# Patient Record
Sex: FEMALE | Race: BLACK OR AFRICAN AMERICAN | Employment: UNEMPLOYED | URBAN - METROPOLITAN AREA
[De-identification: names, ages, dates, MRNs, and addresses within clinical notes are randomized per-mention and may not be internally consistent; named-entity substitution may affect disease eponyms.]

---

## 2017-10-09 ENCOUNTER — HOSPITAL ENCOUNTER (EMERGENCY)
Age: 22
Discharge: HOME | End: 2017-10-09
Attending: EMERGENCY MEDICINE
Payer: MEDICAID

## 2017-10-09 VITALS
SYSTOLIC BLOOD PRESSURE: 136 MMHG | RESPIRATION RATE: 20 BRPM | BODY MASS INDEX: 40.49 KG/M2 | OXYGEN SATURATION: 98 % | WEIGHT: 258 LBS | DIASTOLIC BLOOD PRESSURE: 87 MMHG | HEART RATE: 72 BPM | HEIGHT: 67 IN | TEMPERATURE: 98.5 F

## 2017-10-09 DIAGNOSIS — S61.012A LACERATION OF LEFT THUMB WITHOUT DAMAGE TO NAIL, FOREIGN BODY PRESENCE UNSPECIFIED, INITIAL ENCOUNTER: Primary | ICD-10-CM

## 2017-10-09 PROCEDURE — 99283 EMERGENCY DEPT VISIT LOW MDM: CPT | Performed by: EMERGENCY MEDICINE

## 2017-10-09 PROCEDURE — 90471 IMMUNIZATION ADMIN: CPT | Performed by: EMERGENCY MEDICINE

## 2017-10-09 PROCEDURE — 77030019895 HC PCKNG STRP IODO -A

## 2017-10-09 PROCEDURE — 74011250636 HC RX REV CODE- 250/636: Performed by: EMERGENCY MEDICINE

## 2017-10-09 PROCEDURE — 97602 WOUND(S) CARE NON-SELECTIVE: CPT | Performed by: EMERGENCY MEDICINE

## 2017-10-09 PROCEDURE — 75810000293 HC SIMP/SUPERF WND  RPR: Performed by: EMERGENCY MEDICINE

## 2017-10-09 PROCEDURE — 74011250637 HC RX REV CODE- 250/637: Performed by: EMERGENCY MEDICINE

## 2017-10-09 PROCEDURE — 90715 TDAP VACCINE 7 YRS/> IM: CPT | Performed by: EMERGENCY MEDICINE

## 2017-10-09 RX ORDER — IBUPROFEN 800 MG/1
800 TABLET ORAL
Status: COMPLETED | OUTPATIENT
Start: 2017-10-09 | End: 2017-10-09

## 2017-10-09 RX ORDER — LANOLIN ALCOHOL/MO/W.PET/CERES
CREAM (GRAM) TOPICAL
COMMUNITY
End: 2018-02-07 | Stop reason: CLARIF

## 2017-10-09 RX ADMIN — TETANUS TOXOID, REDUCED DIPHTHERIA TOXOID AND ACELLULAR PERTUSSIS VACCINE, ADSORBED 0.5 ML: 5; 2.5; 8; 8; 2.5 SUSPENSION INTRAMUSCULAR at 19:13

## 2017-10-09 RX ADMIN — IBUPROFEN 800 MG: 800 TABLET ORAL at 20:36

## 2017-10-10 NOTE — ED NOTES
Non-surg candidate per MD.  Hand wrapped with ABX charlotte carr and anahi by Alexx Riley. Awaiting DC instructions.

## 2017-10-10 NOTE — DISCHARGE INSTRUCTIONS
Cuts Left Open: Care Instructions  Your Care Instructions    A cut can happen anywhere on your body. Sometimes a cut can injure the tendons, blood vessels, or nerves. A cut may be left open instead of being closed with stitches, staples, or adhesive. A cut may be left open when it is likely to become infected, because closing it can make infection even more likely. You will probably have a bandage. The doctor may want the cut to stay open the whole time it heals. This happens with some cuts when too much time has gone by since the cut happened. Or the doctor may tell you to come back to have the cut closed in 4 to 5 days, when there is less chance of infection. If the cut stays open while healing, your scar may be larger than if the cut was closed. But you can get treatment later to make the scar smaller. The doctor has checked you carefully, but problems can develop later. If you notice any problems or new symptoms, get medical treatment right away. Follow-up care is a key part of your treatment and safety. Be sure to make and go to all appointments, and call your doctor if you are having problems. It's also a good idea to know your test results and keep a list of the medicines you take. How can you care for yourself at home? · Keep the cut dry for the first 24 to 48 hours. After this, you can shower if your doctor okays it. Pat the cut dry. · Don't soak the cut, such as in a bathtub. Your doctor will tell you when it's safe to get the cut wet. · If your doctor told you how to care for your cut, follow your doctor's instructions. If you did not get instructions, follow this general advice:  ¨ After the first 24 to 48 hours, wash the cut with clean water 2 times a day. Don't use hydrogen peroxide or alcohol, which can slow healing. ¨ You may cover the cut with a thin layer of petroleum jelly, such as Vaseline, and a nonstick bandage.   ¨ Apply more petroleum jelly and replace the bandage as needed. · Prop up the injured area on a pillow anytime you sit or lie down during the next 3 days. Try to keep it above the level of your heart. This will help reduce swelling. · Avoid any activity that could cause your cut to get worse. · Take pain medicines exactly as directed. ¨ If the doctor gave you a prescription medicine for pain, take it as prescribed. ¨ If you are not taking a prescription pain medicine, ask your doctor if you can take an over-the-counter medicine. When should you call for help? Call your doctor now or seek immediate medical care if:  · You have new pain, or your pain gets worse. · The cut starts to bleed, and blood soaks through the bandage. Oozing small amounts of blood is normal.  · The skin near the cut is cold or pale or changes color. · You have tingling, weakness, or numbness near the cut. · You have trouble moving the area near the cut. · You have symptoms of infection, such as:  ¨ Increased pain, swelling, warmth, or redness around the cut. ¨ Red streaks leading from the cut. ¨ Pus draining from the cut. ¨ A fever. Watch closely for changes in your health, and be sure to contact your doctor if:  · The cut is not closing (getting smaller). · You do not get better as expected. Where can you learn more? Go to http://edenilson-jocy.info/. Enter [id number] in the search box to learn more about \"Cuts Left Open: Care Instructions. \"  Current as of: March 20, 2017  Content Version: 11.3  © 8441-1157 [employer]. Care instructions adapted under license by [employer] (which disclaims liability or warranty for this information). If you have questions about a medical condition or this instruction, always ask your healthcare professional. [place] [address] any warranty or liability for your use of this information.

## 2017-10-10 NOTE — ED NOTES
I have reviewed discharge instructions with the patient. The patient verbalized understanding. Patient to follow up with PMD and RTED with any changes/concerns. Patient expresses understanding. Patient ambulatory from ED in NAD. No new Rx.

## 2017-10-10 NOTE — ED PROVIDER NOTES
Patient is a 20 y.o. female presenting with skin laceration. The history is provided by the patient. Laceration    The incident occurred less than 1 hour ago. The laceration is located on the left hand. The laceration is 2 cm in size. The injury mechanism is a clean knife. Foreign body present: no. The pain is at a severity of 10/10. The pain is moderate. The pain has been constant since onset. Associated symptoms include numbness and tingling. Pertinent negatives include no weakness, no loss of motion, no coolness and no discoloration. It is unknown when the patient last had a tetanus shot. History reviewed. No pertinent past medical history. Past Surgical History:   Procedure Laterality Date    HX GYN      c section    HX ORTHOPAEDIC      left ankle         History reviewed. No pertinent family history. Social History     Social History    Marital status: SINGLE     Spouse name: N/A    Number of children: N/A    Years of education: N/A     Occupational History    Not on file. Social History Main Topics    Smoking status: Never Smoker    Smokeless tobacco: Never Used    Alcohol use No    Drug use: No    Sexual activity: Not on file     Other Topics Concern    Not on file     Social History Narrative         ALLERGIES: Review of patient's allergies indicates no known allergies. Review of Systems   Constitutional: Negative for chills and fever. Respiratory: Negative for chest tightness, shortness of breath and stridor. Skin: Negative for color change, pallor and wound. Neurological: Positive for tingling and numbness. Negative for syncope and weakness. All other systems reviewed and are negative.       Vitals:    10/09/17 1726 10/09/17 2029   BP: 142/78 136/87   Pulse: 84 72   Resp: 16 20   Temp: 98.1 °F (36.7 °C) 98.5 °F (36.9 °C)   SpO2: 100% 98%   Weight: 117 kg (258 lb)    Height: 5' 7\" (1.702 m)             Physical Exam   Constitutional: She is oriented to person, place, and time. She appears well-developed and well-nourished. No distress. HENT:   Head: Normocephalic and atraumatic. Eyes: Conjunctivae are normal. Right eye exhibits no discharge. Left eye exhibits no discharge. Neck: Neck supple. Cardiovascular: Intact distal pulses. Capillary refill intact. Pulmonary/Chest: Effort normal. No stridor. No respiratory distress. Musculoskeletal:   Pt movement intact of the thumb she is able to fully flex and extend joint it is painful with motion. Neurological: She is alert and oriented to person, place, and time. No focal weakness speech normal   Skin: Skin is warm and dry. 1.5 cm laceration through the medial aspect of the base off the thumb. Their is a tissue flap present. Psychiatric: She has a normal mood and affect. Her behavior is normal.   Nursing note and vitals reviewed. MDM  Number of Diagnoses or Management Options  Laceration of left thumb without damage to nail, foreign body presence unspecified, initial encounter:   Diagnosis management comments: Pt neuro intact wound left to heal by secondary intent. Ketan Pelletier MD; 10/9/2017 @8:41 PM Voice dictation software was used during the making of this note. This software is not perfect and grammatical and other typographical errors may be present.   This note has not been proofread for errors.  ===================================================================     ED Course       Wound Repair  Date/Time: 10/9/2017 8:38 PM  Performed by: attendingPreparation: skin prepped with Betadine  Location details: right thumb  Wound length:2.5 cm or less    Anesthesia:  Local Anesthetic: lidocaine 1% without epinephrine  Foreign bodies: no foreign bodies  Debridement: extensive (flap of devitalized tissue removed with sutures wound left to heal by secondary intent)  Dressing: antibiotic ointment, non-adhesive packing strip and pressure dressing  Patient tolerance: Patient tolerated the procedure well with no immediate complications  My total time at bedside, performing this procedure was 1-15 minutes.

## 2018-02-07 ENCOUNTER — HOSPITAL ENCOUNTER (EMERGENCY)
Age: 23
Discharge: HOME | End: 2018-02-07
Attending: EMERGENCY MEDICINE
Payer: SELF-PAY

## 2018-02-07 VITALS
HEIGHT: 67 IN | WEIGHT: 258 LBS | TEMPERATURE: 98.7 F | RESPIRATION RATE: 20 BRPM | BODY MASS INDEX: 40.49 KG/M2 | HEART RATE: 110 BPM | SYSTOLIC BLOOD PRESSURE: 103 MMHG | DIASTOLIC BLOOD PRESSURE: 68 MMHG | OXYGEN SATURATION: 98 %

## 2018-02-07 DIAGNOSIS — R11.2 NON-INTRACTABLE VOMITING WITH NAUSEA, UNSPECIFIED VOMITING TYPE: Primary | ICD-10-CM

## 2018-02-07 LAB
ALBUMIN SERPL-MCNC: 4 G/DL (ref 3.5–5)
ALBUMIN/GLOB SERPL: 0.9 {RATIO} (ref 1.2–3.5)
ALP SERPL-CCNC: 69 U/L (ref 50–136)
ALT SERPL-CCNC: 15 U/L (ref 12–65)
ANION GAP SERPL CALC-SCNC: 8 MMOL/L (ref 7–16)
AST SERPL-CCNC: 15 U/L (ref 15–37)
BASOPHILS # BLD: 0 K/UL (ref 0–0.2)
BASOPHILS NFR BLD: 0 % (ref 0–2)
BILIRUB SERPL-MCNC: 1 MG/DL (ref 0.2–1.1)
BUN SERPL-MCNC: 12 MG/DL (ref 6–23)
CALCIUM SERPL-MCNC: 9.3 MG/DL (ref 8.3–10.4)
CHLORIDE SERPL-SCNC: 105 MMOL/L (ref 98–107)
CO2 SERPL-SCNC: 26 MMOL/L (ref 21–32)
CREAT SERPL-MCNC: 0.8 MG/DL (ref 0.6–1)
DIFFERENTIAL METHOD BLD: ABNORMAL
EOSINOPHIL # BLD: 0 K/UL (ref 0–0.8)
EOSINOPHIL NFR BLD: 0 % (ref 0.5–7.8)
ERYTHROCYTE [DISTWIDTH] IN BLOOD BY AUTOMATED COUNT: 14 % (ref 11.9–14.6)
FLUAV AG NPH QL IA: NEGATIVE
FLUBV AG NPH QL IA: NEGATIVE
GLOBULIN SER CALC-MCNC: 4.6 G/DL (ref 2.3–3.5)
GLUCOSE SERPL-MCNC: 107 MG/DL (ref 65–100)
HCT VFR BLD AUTO: 35.8 % (ref 35.8–46.3)
HGB BLD-MCNC: 11.8 G/DL (ref 11.7–15.4)
IMM GRANULOCYTES # BLD: 0 K/UL (ref 0–0.5)
IMM GRANULOCYTES NFR BLD AUTO: 0 % (ref 0–5)
LYMPHOCYTES # BLD: 0.7 K/UL (ref 0.5–4.6)
LYMPHOCYTES NFR BLD: 20 % (ref 13–44)
MCH RBC QN AUTO: 25.9 PG (ref 26.1–32.9)
MCHC RBC AUTO-ENTMCNC: 33 G/DL (ref 31.4–35)
MCV RBC AUTO: 78.7 FL (ref 79.6–97.8)
MONOCYTES # BLD: 0.3 K/UL (ref 0.1–1.3)
MONOCYTES NFR BLD: 8 % (ref 4–12)
NEUTS SEG # BLD: 2.6 K/UL (ref 1.7–8.2)
NEUTS SEG NFR BLD: 72 % (ref 43–78)
PLATELET # BLD AUTO: 290 K/UL (ref 150–450)
PMV BLD AUTO: 11 FL (ref 10.8–14.1)
POTASSIUM SERPL-SCNC: 3.4 MMOL/L (ref 3.5–5.1)
PROT SERPL-MCNC: 8.6 G/DL (ref 6.3–8.2)
RBC # BLD AUTO: 4.55 M/UL (ref 4.05–5.25)
SODIUM SERPL-SCNC: 139 MMOL/L (ref 136–145)
WBC # BLD AUTO: 3.7 K/UL (ref 4.3–11.1)

## 2018-02-07 PROCEDURE — 99282 EMERGENCY DEPT VISIT SF MDM: CPT | Performed by: EMERGENCY MEDICINE

## 2018-02-07 PROCEDURE — 74011250636 HC RX REV CODE- 250/636: Performed by: EMERGENCY MEDICINE

## 2018-02-07 PROCEDURE — 80053 COMPREHEN METABOLIC PANEL: CPT | Performed by: EMERGENCY MEDICINE

## 2018-02-07 PROCEDURE — 87804 INFLUENZA ASSAY W/OPTIC: CPT | Performed by: EMERGENCY MEDICINE

## 2018-02-07 PROCEDURE — 96374 THER/PROPH/DIAG INJ IV PUSH: CPT | Performed by: EMERGENCY MEDICINE

## 2018-02-07 PROCEDURE — 85025 COMPLETE CBC W/AUTO DIFF WBC: CPT | Performed by: EMERGENCY MEDICINE

## 2018-02-07 PROCEDURE — 74011250637 HC RX REV CODE- 250/637: Performed by: EMERGENCY MEDICINE

## 2018-02-07 PROCEDURE — 96361 HYDRATE IV INFUSION ADD-ON: CPT | Performed by: EMERGENCY MEDICINE

## 2018-02-07 RX ORDER — DICLOFENAC SODIUM 50 MG/1
50 TABLET, DELAYED RELEASE ORAL 2 TIMES DAILY
Qty: 14 TAB | Refills: 0 | Status: SHIPPED | OUTPATIENT
Start: 2018-02-07

## 2018-02-07 RX ORDER — ONDANSETRON 2 MG/ML
4 INJECTION INTRAMUSCULAR; INTRAVENOUS
Status: COMPLETED | OUTPATIENT
Start: 2018-02-07 | End: 2018-02-07

## 2018-02-07 RX ORDER — HYOSCYAMINE SULFATE 0.12 MG/1
0.25 TABLET SUBLINGUAL
Status: COMPLETED | OUTPATIENT
Start: 2018-02-07 | End: 2018-02-07

## 2018-02-07 RX ORDER — PROMETHAZINE HYDROCHLORIDE 25 MG/1
25 TABLET ORAL
Qty: 12 TAB | Refills: 0 | Status: SHIPPED | OUTPATIENT
Start: 2018-02-07

## 2018-02-07 RX ADMIN — ONDANSETRON 4 MG: 2 INJECTION INTRAMUSCULAR; INTRAVENOUS at 12:18

## 2018-02-07 RX ADMIN — HYOSCYAMINE SULFATE 0.25 MG: 0.12 TABLET ORAL at 12:18

## 2018-02-07 RX ADMIN — SODIUM CHLORIDE 1000 ML: 900 INJECTION, SOLUTION INTRAVENOUS at 12:19

## 2018-02-07 NOTE — ED NOTES
I have reviewed discharge instructions with the patient. The patient verbalized understanding. Patient left ED via Discharge Method: ambulatory to Home with self. Opportunity for questions and clarification provided. Patient given 2 scripts. To continue your aftercare when you leave the hospital, you may receive an automated call from our care team to check in on how you are doing. This is a free service and part of our promise to provide the best care and service to meet your aftercare needs.  If you have questions, or wish to unsubscribe from this service please call [phone]. Thank you for Choosing our [facility] Emergency Department.

## 2018-02-07 NOTE — ED TRIAGE NOTES
C/o generalized body aches, abdominal pain, n/v/d and chills. Onset yesterday.   States exposed to flu

## 2018-02-07 NOTE — ED PROVIDER NOTES
HPI Comments: 62-year-old female who presents with concerns about vomiting and some mild abdominal pain. Patient says her pain started after she had been throwing up. She says her emesis has been nonbloody nonbilious and she's had no blood in her bowels. She denies any specific fevers and says she has never had a problem like this before. She has had exposure to the flu so she was worried that potentially she may be getting that. Elements of this note were made using speech recognition software. As such, there may be errors of speech recognition present. Patient is a 20 y.o. female presenting with general illness. The history is provided by the patient. Generalized Body Aches   Associated symptoms include abdominal pain. Pertinent negatives include no chest pain, no headaches and no shortness of breath. History reviewed. No pertinent past medical history. Past Surgical History:   Procedure Laterality Date    HX GYN      c section    HX ORTHOPAEDIC      left ankle         History reviewed. No pertinent family history. Social History     Social History    Marital status: SINGLE     Spouse name: N/A    Number of children: N/A    Years of education: N/A     Occupational History    Not on file. Social History Main Topics    Smoking status: Never Smoker    Smokeless tobacco: Never Used    Alcohol use No    Drug use: No    Sexual activity: Not on file     Other Topics Concern    Not on file     Social History Narrative         ALLERGIES: Review of patient's allergies indicates no known allergies. Review of Systems   Constitutional: Positive for chills. Negative for diaphoresis and fever. HENT: Negative for congestion, rhinorrhea and sore throat. Eyes: Negative for redness and visual disturbance. Respiratory: Negative for cough, chest tightness, shortness of breath and wheezing. Cardiovascular: Negative for chest pain and palpitations.    Gastrointestinal: Positive for abdominal pain, nausea and vomiting. Negative for blood in stool and diarrhea. Endocrine: Negative for polydipsia and polyuria. Genitourinary: Negative for dysuria and hematuria. Musculoskeletal: Negative for arthralgias, myalgias and neck stiffness. Skin: Negative for rash. Allergic/Immunologic: Negative for environmental allergies and food allergies. Neurological: Negative for dizziness, weakness and headaches. Hematological: Negative for adenopathy. Does not bruise/bleed easily. Psychiatric/Behavioral: Negative for confusion and sleep disturbance. The patient is not nervous/anxious. Vitals:    02/07/18 0956   BP: 103/68   Pulse: (!) 110   Resp: 20   Temp: 98.7 °F (37.1 °C)   SpO2: 98%   Weight: 117 kg (258 lb)   Height: 5' 7\" (1.702 m)            Physical Exam   Constitutional: She is oriented to person, place, and time. She appears well-developed and well-nourished. HENT:   Head: Normocephalic and atraumatic. Eyes: Conjunctivae and EOM are normal. Pupils are equal, round, and reactive to light. Neck: Normal range of motion. Cardiovascular: Regular rhythm. Patient's heart rate was 88 on my exam   Pulmonary/Chest: Effort normal and breath sounds normal. No respiratory distress. She has no wheezes. She has no rales. She exhibits no tenderness. Abdominal: Soft. Bowel sounds are normal. There is no rebound and no guarding. Musculoskeletal: Normal range of motion. She exhibits no edema or tenderness. Lymphadenopathy:     She has no cervical adenopathy. Neurological: She is alert and oriented to person, place, and time. Skin: Skin is warm and dry. Psychiatric: She has a normal mood and affect. Nursing note and vitals reviewed.        MDM  Number of Diagnoses or Management Options        ED Course       Procedures

## 2018-02-07 NOTE — DISCHARGE INSTRUCTIONS
Return with any fevers, blood in your vomiting, blood in your bowels, worsening symptoms,  Or additional concerns. Follow-up with your primary care doctor as needed.

## 2019-01-27 ENCOUNTER — APPOINTMENT (OUTPATIENT)
Dept: ULTRASOUND IMAGING | Age: 24
End: 2019-01-27
Attending: EMERGENCY MEDICINE
Payer: SELF-PAY

## 2019-01-27 ENCOUNTER — HOSPITAL ENCOUNTER (EMERGENCY)
Age: 24
Discharge: HOME | End: 2019-01-27
Attending: EMERGENCY MEDICINE | Admitting: EMERGENCY MEDICINE
Payer: SELF-PAY

## 2019-01-27 VITALS
HEART RATE: 105 BPM | BODY MASS INDEX: 40.02 KG/M2 | TEMPERATURE: 99.6 F | HEIGHT: 67 IN | RESPIRATION RATE: 16 BRPM | WEIGHT: 255 LBS | SYSTOLIC BLOOD PRESSURE: 110 MMHG | OXYGEN SATURATION: 100 % | DIASTOLIC BLOOD PRESSURE: 70 MMHG

## 2019-01-27 DIAGNOSIS — N73.0 PID (ACUTE PELVIC INFLAMMATORY DISEASE): Primary | ICD-10-CM

## 2019-01-27 DIAGNOSIS — A59.01 TRICHOMONAS VAGINITIS: ICD-10-CM

## 2019-01-27 LAB
ALBUMIN SERPL-MCNC: 3.4 G/DL (ref 3.5–5)
ALBUMIN/GLOB SERPL: 0.7 {RATIO} (ref 1.2–3.5)
ALP SERPL-CCNC: 204 U/L (ref 50–136)
ALT SERPL-CCNC: 407 U/L (ref 12–65)
ANION GAP SERPL CALC-SCNC: 9 MMOL/L (ref 7–16)
AST SERPL-CCNC: 434 U/L (ref 15–37)
BACTERIA URNS QL MICRO: 0 /HPF
BASOPHILS # BLD: 0.1 K/UL (ref 0–0.2)
BASOPHILS NFR BLD: 1 % (ref 0–2)
BILIRUB SERPL-MCNC: 0.8 MG/DL (ref 0.2–1.1)
BUN SERPL-MCNC: 7 MG/DL (ref 6–23)
CALCIUM SERPL-MCNC: 8.6 MG/DL (ref 8.3–10.4)
CASTS URNS QL MICRO: NORMAL /LPF
CHLORIDE SERPL-SCNC: 102 MMOL/L (ref 98–107)
CO2 SERPL-SCNC: 24 MMOL/L (ref 21–32)
CREAT SERPL-MCNC: 0.79 MG/DL (ref 0.6–1)
DIFFERENTIAL METHOD BLD: ABNORMAL
EOSINOPHIL # BLD: 0 K/UL (ref 0–0.8)
EOSINOPHIL NFR BLD: 0 % (ref 0.5–7.8)
EPI CELLS #/AREA URNS HPF: NORMAL /HPF
ERYTHROCYTE [DISTWIDTH] IN BLOOD BY AUTOMATED COUNT: 16.4 % (ref 11.9–14.6)
GLOBULIN SER CALC-MCNC: 4.6 G/DL (ref 2.3–3.5)
GLUCOSE SERPL-MCNC: 94 MG/DL (ref 65–100)
HCG UR QL: NEGATIVE
HCT VFR BLD AUTO: 30.7 % (ref 35.8–46.3)
HGB BLD-MCNC: 9 G/DL (ref 11.7–15.4)
IMM GRANULOCYTES # BLD AUTO: 0 K/UL (ref 0–0.5)
IMM GRANULOCYTES NFR BLD AUTO: 1 % (ref 0–5)
LACTATE BLD-SCNC: 1.14 MMOL/L (ref 0.5–1.9)
LYMPHOCYTES # BLD: 4.5 K/UL (ref 0.5–4.6)
LYMPHOCYTES NFR BLD: 67 % (ref 13–44)
MCH RBC QN AUTO: 21.3 PG (ref 26.1–32.9)
MCHC RBC AUTO-ENTMCNC: 29.3 G/DL (ref 31.4–35)
MCV RBC AUTO: 72.7 FL (ref 79.6–97.8)
MONOCYTES # BLD: 0.4 K/UL (ref 0.1–1.3)
MONOCYTES NFR BLD: 6 % (ref 4–12)
NEUTS SEG # BLD: 1.7 K/UL (ref 1.7–8.2)
NEUTS SEG NFR BLD: 25 % (ref 43–78)
NRBC # BLD: 0 K/UL (ref 0–0.2)
PLATELET # BLD AUTO: 230 K/UL (ref 150–450)
PMV BLD AUTO: 12.1 FL (ref 9.4–12.3)
POTASSIUM SERPL-SCNC: 3.6 MMOL/L (ref 3.5–5.1)
PROT SERPL-MCNC: 8 G/DL (ref 6.3–8.2)
RBC # BLD AUTO: 4.22 M/UL (ref 4.05–5.2)
RBC #/AREA URNS HPF: NORMAL /HPF
SERVICE CMNT-IMP: NORMAL
SODIUM SERPL-SCNC: 135 MMOL/L (ref 136–145)
WBC # BLD AUTO: 6.7 K/UL (ref 4.3–11.1)
WBC URNS QL MICRO: NORMAL /HPF
WET PREP GENITAL: NORMAL

## 2019-01-27 PROCEDURE — 83605 ASSAY OF LACTIC ACID: CPT

## 2019-01-27 PROCEDURE — 81025 URINE PREGNANCY TEST: CPT

## 2019-01-27 PROCEDURE — 87491 CHLMYD TRACH DNA AMP PROBE: CPT

## 2019-01-27 PROCEDURE — 76856 US EXAM PELVIC COMPLETE: CPT

## 2019-01-27 PROCEDURE — 96374 THER/PROPH/DIAG INJ IV PUSH: CPT | Performed by: EMERGENCY MEDICINE

## 2019-01-27 PROCEDURE — 81003 URINALYSIS AUTO W/O SCOPE: CPT | Performed by: EMERGENCY MEDICINE

## 2019-01-27 PROCEDURE — 99284 EMERGENCY DEPT VISIT MOD MDM: CPT | Performed by: EMERGENCY MEDICINE

## 2019-01-27 PROCEDURE — 74011250636 HC RX REV CODE- 250/636: Performed by: EMERGENCY MEDICINE

## 2019-01-27 PROCEDURE — 74011250637 HC RX REV CODE- 250/637: Performed by: EMERGENCY MEDICINE

## 2019-01-27 PROCEDURE — 80053 COMPREHEN METABOLIC PANEL: CPT

## 2019-01-27 PROCEDURE — 81015 MICROSCOPIC EXAM OF URINE: CPT

## 2019-01-27 PROCEDURE — 80074 ACUTE HEPATITIS PANEL: CPT

## 2019-01-27 PROCEDURE — 85025 COMPLETE CBC W/AUTO DIFF WBC: CPT

## 2019-01-27 PROCEDURE — 87210 SMEAR WET MOUNT SALINE/INK: CPT

## 2019-01-27 RX ORDER — ONDANSETRON 4 MG/1
4 TABLET, ORALLY DISINTEGRATING ORAL
Qty: 15 TAB | Refills: 0 | Status: SHIPPED | OUTPATIENT
Start: 2019-01-27 | End: 2019-04-01

## 2019-01-27 RX ORDER — DOXYCYCLINE HYCLATE 100 MG
100 TABLET ORAL 2 TIMES DAILY
Qty: 20 TAB | Refills: 0 | Status: SHIPPED | OUTPATIENT
Start: 2019-01-27 | End: 2019-02-06

## 2019-01-27 RX ORDER — IBUPROFEN 800 MG/1
800 TABLET ORAL
Qty: 20 TAB | Refills: 0 | Status: SHIPPED | OUTPATIENT
Start: 2019-01-27 | End: 2019-02-03

## 2019-01-27 RX ORDER — ACETAMINOPHEN 325 MG/1
650 TABLET ORAL
Status: COMPLETED | OUTPATIENT
Start: 2019-01-27 | End: 2019-01-27

## 2019-01-27 RX ORDER — HYDROCODONE BITARTRATE AND ACETAMINOPHEN 5; 325 MG/1; MG/1
1 TABLET ORAL
Qty: 10 TAB | Refills: 0 | Status: SHIPPED | OUTPATIENT
Start: 2019-01-27

## 2019-01-27 RX ORDER — ONDANSETRON 2 MG/ML
4 INJECTION INTRAMUSCULAR; INTRAVENOUS
Status: COMPLETED | OUTPATIENT
Start: 2019-01-27 | End: 2019-01-27

## 2019-01-27 RX ORDER — METRONIDAZOLE 500 MG/1
2000 TABLET ORAL
Status: COMPLETED | OUTPATIENT
Start: 2019-01-27 | End: 2019-01-27

## 2019-01-27 RX ADMIN — ONDANSETRON 4 MG: 2 INJECTION INTRAMUSCULAR; INTRAVENOUS at 21:33

## 2019-01-27 RX ADMIN — ACETAMINOPHEN 650 MG: 325 TABLET, FILM COATED ORAL at 20:09

## 2019-01-27 RX ADMIN — SODIUM CHLORIDE 1000 ML: 900 INJECTION, SOLUTION INTRAVENOUS at 20:10

## 2019-01-27 RX ADMIN — METRONIDAZOLE 2000 MG: 500 TABLET ORAL at 20:14

## 2019-01-27 NOTE — LETTER
[facility] EMERGENCY DEPT [address] [address] 
[phone] Work/School Note Date: 1/27/2019 To Whom It May concern: 
 
Mayuri Villarreal was seen and treated today in the emergency room by the following provider(s): 
Attending Provider: Asha Matamoros MD. Kayla Winchester {Return to school/sport/work:1/29/19 Sincerely, Ayni Nixon MD

## 2019-01-27 NOTE — ED TRIAGE NOTES
Pt states symptoms x \"a couple weeks\", pt states dizziness with standing. States nausea any time she eats something. Generalized body aches. Has been tested for the flu and tested negative. Pt states her urine today was dark orange. Pt states her menstrual cycle just finished and it was very heavy, way worse than anything she's had before. Pt states lots of mucus mixed with blood and clots. Pt states pelvic pain and low back pain. Pt states vaginal discharge is different than her normal, \"its orange\" she states. States her period ended on Friday and it was longer than normal as well.

## 2019-01-27 NOTE — LETTER
2/2/2019 April Ferrari [id number] [facility] [address] [place] [address] [phone] [facility] [address] Dear Ms. Hansenis Leesa, You were recently seen in the Emergency Department of [facility] and had blood work or x-rays performed. We would like to discuss these with you. Please call the Emergency Department at your earliest convenience. If you were seen at [facility], please dial [phone], and if you were seen at [facility], please call [phone] to speak with one of our providers. Sincerely, Reza Zhao MD 
Medical Director Emergency Services, [address] [place] [address]  
[phone]/ [phone]

## 2019-01-28 NOTE — ED NOTES
I have reviewed discharge instructions with the patient. The patient verbalized understanding. Patient left ED via Discharge Method: wheelchair to Home with sister. Opportunity for questions and clarification provided. Patient given 4 scripts. To continue your aftercare when you leave the hospital, you may receive an automated call from our care team to check in on how you are doing. This is a free service and part of our promise to provide the best care and service to meet your aftercare needs.  If you have questions, or wish to unsubscribe from this service please call [phone]. Thank you for Choosing our [facility] Emergency Department.

## 2019-01-28 NOTE — ED PROVIDER NOTES
HPI: 
69-year-old female here with multiple complaints. Has had cough and congestion and upper respiratory infection and headache for the past 2 weeks. No fever. Also having lower abdominal pain with yellow/green vaginal discharge for the past 2 weeks. Denies any urinary pain. No flank pain. Sexually active with one partner. Has history of irregular menstrual cycle. Most recent menstrual cycle was 2 days ago. ROS Constitutional: No fever, no chills Skin: no rash Eye: No vision changes + sinus pressure ENMT: No sore throat Respiratory: No shortness of breath, no cough. Cardiovascular: No chest pain, no palpitations Gastrointestinal: No vomiting, no nausea, no diarrhea, + abdominal pain : No dysuria MSK: No back pain, no muscle pain Neuro: No headache, no change in mental status, no numbness, no tingling, no weakness Psych:  
Endocrine:  
All other review of systems positive per history of present illness and the above otherwise negative or noncontributory. Visit Vitals /64 (BP 1 Location: Right arm, BP Patient Position: At rest) Pulse (!) 123 Temp 100.4 °F (38 °C) Resp 14 Ht 5' 7\" (1.702 m) Wt 115.7 kg (255 lb) SpO2 100% BMI 39.94 kg/m² History reviewed. No pertinent past medical history. Past Surgical History:  
Procedure Laterality Date  HX GYN    
 c section  HX ORTHOPAEDIC    
 left ankle Prior to Admission Medications Prescriptions Last Dose Informant Patient Reported? Taking?  
diclofenac EC (VOLTAREN) 50 mg EC tablet   No No  
Sig: Take 1 Tab by mouth two (2) times a day. promethazine (PHENERGAN) 25 mg tablet   No No  
Sig: Take 1 Tab by mouth every six (6) hours as needed for Nausea. Facility-Administered Medications: None Adult Exam  
General: alert, no acute distress Head: normocephalic, atraumatic ENT: moist mucous membranes TMs without otitis media. EOMI, PERRLA, boggy nasal turbinates. Posterior pharynx without any exudate or edema Neck: supple, non-tender; full range of motion. No meningeal sign Cardiovascular: regular rate and rhythm, normal peripheral perfusion, no edema Respiratory:  normal respirations; no wheezing, rales or rhonchi Gastrointestinal: soft, tender to palpation in the lower abdomen; no rebound or guarding, no peritoneal signs, no distension : Pelvic with yellow-green discharge. Cervical motion tenderness. No adnexal masses but generalized discomfort. Back: non-tender, full range of motion Musculoskeletal: normal range of motion, normal strength, no gross deformities Neurological: alert and oriented x 4, no gross focal deficits; normal speech Psychiatric: cooperative; appropriate mood and affect MDM: We will obtain ultrasound of pelvic for further assessment PID, tubo-ovarian abscess. Urine pregnancy test negative. Lactic acid of 1.1. Wet prep With moderate trichomonas. We will give 2 g of Flagyl. 9:41 PM 
Ultrasound obtained. No obvious signs of tubo-ovarian abscess. Normal blood flow. Low suspicion for appendicitis. Nonspecific elevated liver enzyme. Will send for hepatitis panel. She is not pregnant. Concern for PID. Will give doxycycline. Recommend no sexual intercourse for at least 3 weeks and recommend her sexual partner get tested. Recommend follow-up with primary care physician with [facility]. Return precautions given. No results found. Recent Results (from the past 24 hour(s)) POC LACTIC ACID Collection Time: 01/27/19  6:26 PM  
Result Value Ref Range Lactic Acid (POC) 1.14 0.5 - 1.9 mmol/L METABOLIC PANEL, COMPREHENSIVE Collection Time: 01/27/19  6:30 PM  
Result Value Ref Range Sodium 135 (L) 136 - 145 mmol/L Potassium 3.6 3.5 - 5.1 mmol/L Chloride 102 98 - 107 mmol/L  
 CO2 24 21 - 32 mmol/L Anion gap 9 7 - 16 mmol/L Glucose 94 65 - 100 mg/dL  BUN 7 6 - 23 MG/DL  
 Creatinine 0.79 0.6 - 1.0 MG/DL  
 GFR est AA >60 >60 ml/min/1.73m2 GFR est non-AA >60 >60 ml/min/1.73m2 Calcium 8.6 8.3 - 10.4 MG/DL Bilirubin, total 0.8 0.2 - 1.1 MG/DL  
 ALT (SGPT) 407 (H) 12 - 65 U/L  
 AST (SGOT) 434 (H) 15 - 37 U/L Alk. phosphatase 204 (H) 50 - 136 U/L Protein, total 8.0 6.3 - 8.2 g/dL Albumin 3.4 (L) 3.5 - 5.0 g/dL Globulin 4.6 (H) 2.3 - 3.5 g/dL A-G Ratio 0.7 (L) 1.2 - 3.5 Dragon voice recognition software was used to create this note. Although the note has been reviewed and corrected where necessary, additional errors may have been overlooked and remain in the text.

## 2019-01-28 NOTE — DISCHARGE INSTRUCTIONS
Complete course of antibiotics. Drink plenty of fluid. No sexual intercourse for at least 3-4 weeks. Tell your sexual partner to get STDs testing at local health clinics. Return if worsening symptoms.

## 2019-01-29 LAB
HAV IGM SERPL QL IA: NEGATIVE
HBV CORE IGM SERPL QL IA: NEGATIVE
HBV SURFACE AG SERPL QL IA: NEGATIVE
HCV AB S/CO SERPL IA: 0.3 S/CO RATIO (ref 0–0.9)

## 2019-01-31 LAB
C TRACH RRNA SPEC QL NAA+PROBE: POSITIVE
N GONORRHOEA RRNA SPEC QL NAA+PROBE: NEGATIVE
SPECIMEN SOURCE: ABNORMAL

## 2019-02-01 NOTE — PROGRESS NOTES
Janessadenny Hooversumit   24 y.o.  [phone] (home) Pt with positive chlamydia cultures. ED TREATMENT: 
Orders Placed This Encounter WET PREP 
    US PELV NON OBS 
    CBC WITH AUTOMATED DIFF 
    METABOLIC PANEL,COMP. Echo Waggoner / Zach Kong URINE MICROSCOPIC 
    HEPATITIS PANEL, ACUTE 
    POC URINE DIPSTICK POC LACTIC ACID 
    HCG URINE, QL. - POC 
    SCANNED RADIOLOGY RESULTS 
    acetaminophen (TYLENOL) tablet 650 mg 
    metroNIDAZOLE (FLAGYL) tablet 2,000 mg 
    sodium chloride 0.9 % bolus infusion 1,000 mL 
    ondansetron (ZOFRAN) injection 4 mg 
    doxycycline (VIBRA-TABS) 100 mg tablet 
    ondansetron (ZOFRAN ODT) 4 mg disintegrating tablet HYDROcodone-acetaminophen (NORCO) 5-325 mg per tablet 
    ibuprofen (MOTRIN) 800 mg tablet Patient needs: notification Patient called. Left message for call back KRISTINE Roque; 2/1/2019 @9:00 AM===========================================

## 2019-04-01 ENCOUNTER — HOSPITAL ENCOUNTER (EMERGENCY)
Age: 24
Discharge: HOME | End: 2019-04-02
Attending: EMERGENCY MEDICINE
Payer: SELF-PAY

## 2019-04-01 DIAGNOSIS — J06.9 ACUTE URI: Primary | ICD-10-CM

## 2019-04-01 LAB
DEPRECATED S PYO AG THROAT QL EIA: NEGATIVE
FLUAV AG NPH QL IA: NEGATIVE
FLUBV AG NPH QL IA: NEGATIVE
SPECIMEN SOURCE: NORMAL

## 2019-04-01 PROCEDURE — 74011250637 HC RX REV CODE- 250/637: Performed by: EMERGENCY MEDICINE

## 2019-04-01 PROCEDURE — 87804 INFLUENZA ASSAY W/OPTIC: CPT

## 2019-04-01 PROCEDURE — 87081 CULTURE SCREEN ONLY: CPT

## 2019-04-01 PROCEDURE — 99283 EMERGENCY DEPT VISIT LOW MDM: CPT | Performed by: EMERGENCY MEDICINE

## 2019-04-01 PROCEDURE — 87880 STREP A ASSAY W/OPTIC: CPT

## 2019-04-01 RX ORDER — ONDANSETRON 4 MG/1
4 TABLET, ORALLY DISINTEGRATING ORAL
Qty: 12 TAB | Refills: 0 | Status: SHIPPED | OUTPATIENT
Start: 2019-04-01

## 2019-04-01 RX ORDER — AZITHROMYCIN 250 MG/1
TABLET, FILM COATED ORAL
Qty: 6 TAB | Refills: 0 | Status: SHIPPED | OUTPATIENT
Start: 2019-04-01 | End: 2019-04-06

## 2019-04-01 RX ORDER — DEXAMETHASONE SODIUM PHOSPHATE 100 MG/10ML
10 INJECTION INTRAMUSCULAR; INTRAVENOUS
Status: COMPLETED | OUTPATIENT
Start: 2019-04-01 | End: 2019-04-01

## 2019-04-01 RX ORDER — IBUPROFEN 800 MG/1
800 TABLET ORAL
Qty: 20 TAB | Refills: 0 | Status: SHIPPED | OUTPATIENT
Start: 2019-04-01 | End: 2019-04-08

## 2019-04-01 RX ADMIN — DEXAMETHASONE SODIUM PHOSPHATE 10 MG: 10 INJECTION INTRAMUSCULAR; INTRAVENOUS at 23:53

## 2019-04-02 VITALS
DIASTOLIC BLOOD PRESSURE: 70 MMHG | TEMPERATURE: 98.4 F | RESPIRATION RATE: 18 BRPM | SYSTOLIC BLOOD PRESSURE: 110 MMHG | OXYGEN SATURATION: 100 % | HEIGHT: 67 IN | HEART RATE: 98 BPM | BODY MASS INDEX: 39.24 KG/M2 | WEIGHT: 250 LBS

## 2019-04-02 NOTE — ED PROVIDER NOTES
Presents with complaint of body aches and sore throat nausea and vomiting after brushing her teeth and change in her voice. Symptoms have been there for 24 hours. The history is provided by the patient. Sore Throat This is a new problem. The current episode started yesterday. The problem has not changed since onset. Patient reports a subjective fever - was not measured. Associated symptoms include congestion, trouble swallowing and cough. Pertinent negatives include no ear pain and no shortness of breath. She has had no exposure to strep or mono. No past medical history on file. Past Surgical History:  
Procedure Laterality Date  HX GYN    
 c section  HX ORTHOPAEDIC    
 left ankle No family history on file. Social History Socioeconomic History  Marital status: SINGLE Spouse name: Not on file  Number of children: Not on file  Years of education: Not on file  Highest education level: Not on file Occupational History  Not on file Social Needs  Financial resource strain: Not on file  Food insecurity:  
  Worry: Not on file Inability: Not on file  Transportation needs:  
  Medical: Not on file Non-medical: Not on file Tobacco Use  Smoking status: Never Smoker  Smokeless tobacco: Never Used Substance and Sexual Activity  Alcohol use: No  
 Drug use: No  
 Sexual activity: Not on file Lifestyle  Physical activity:  
  Days per week: Not on file Minutes per session: Not on file  Stress: Not on file Relationships  Social connections:  
  Talks on phone: Not on file Gets together: Not on file Attends Confucianism service: Not on file Active member of club or organization: Not on file Attends meetings of clubs or organizations: Not on file Relationship status: Not on file  Intimate partner violence:  
  Fear of current or ex partner: Not on file Emotionally abused: Not on file Physically abused: Not on file Forced sexual activity: Not on file Other Topics Concern  Not on file Social History Narrative  Not on file ALLERGIES: Patient has no known allergies. Review of Systems HENT: Positive for congestion, sore throat and trouble swallowing. Negative for ear pain. Respiratory: Positive for cough. Negative for shortness of breath. All other systems reviewed and are negative. Vitals:  
 04/01/19 2222 BP: 105/64 Pulse: (!) 125 Resp: 18 Temp: 98.3 °F (36.8 °C) SpO2: 100% Weight: 113.4 kg (250 lb) Height: 5' 7\" (1.702 m) Physical Exam  
Constitutional: She is oriented to person, place, and time. She appears well-developed and well-nourished. No distress. HENT:  
Head: Normocephalic and atraumatic. Right Ear: Tympanic membrane normal.  
Left Ear: Tympanic membrane normal.  
Mouth/Throat: Oropharynx is clear and moist. Mucous membranes are dry. Eyes: Conjunctivae are normal. Right eye exhibits no discharge. Left eye exhibits no discharge. Neck: Normal range of motion. Neck supple. Cardiovascular: Normal rate and regular rhythm. Pulmonary/Chest: Effort normal and breath sounds normal. No respiratory distress. Abdominal: Soft. She exhibits no distension. There is no tenderness. Musculoskeletal: Normal range of motion. She exhibits no edema. Neurological: She is alert and oriented to person, place, and time. No cranial nerve deficit. Skin: Skin is warm and dry. Capillary refill takes less than 2 seconds. She is not diaphoretic. Psychiatric: She has a normal mood and affect. Her behavior is normal.  
Nursing note and vitals reviewed. MDM Number of Diagnoses or Management Options Acute URI:  
Diagnosis management comments: Home with zofran, azithro, geven decadron. Amount and/or Complexity of Data Reviewed Clinical lab tests: reviewed and ordered Risk of Complications, Morbidity, and/or Mortality Presenting problems: moderate Diagnostic procedures: moderate Management options: moderate Patient Progress Patient progress: stable Procedures

## 2019-04-02 NOTE — DISCHARGE INSTRUCTIONS
Patient Education        Learning About the Safe Use of Antibiotics  Introduction    Antibiotics are drugs used to kill bacteria. Bacteria can cause infections. These include strep throat, ear infections, and pneumonia. These medicines can't cure everything. They don't kill viruses or help with allergies. They don't help illnesses such as the common cold, the flu, or a runny nose. And they can cause side effects. There are many types of antibiotics. Your doctor will decide which one will work best for your infection. Examples include:  · Amoxicillin. · Cephalexin (Keflex). · Ciprofloxacin (Cipro). What are the possible side effects? Side effects can include:  · Nausea. · Diarrhea. · Skin rash. · Yeast infection. · A severe allergic reaction. It may cause itching, swelling, and breathing problems. This is rare. You may have other side effects or reactions not listed here. Check the information that comes with your medicine. Should you take antibiotics just in case? Don't take antibiotics when you don't need them. If you do that, they may not work when you do need them. Each time you take antibiotics, you are more likely to have some bacteria that survive and aren't killed by the medicine. Bacteria that don't die can change and become even harder to kill. These are called antibiotic-resistant bacteria. They can cause longer and more serious infections. To treat them, you may need different, stronger antibiotics that have more side effects and may cost more. So always ask your doctor if antibiotics are the best treatment. Explain that you do not want antibiotics unless you need them. Help protect the community  Using antibiotics when they're not needed leads to the development of antibiotic-resistant bacteria. These tougher bacteria can spread to family members, children, and coworkers.  People in your community will have a risk of getting an infection that is harder to cure and that costs more to treat.  How can you take antibiotics safely? Be safe with medicine. Take your antibiotics as directed. Do not stop taking them just because you feel better. You need to take the full course of medicine. This will help make sure your infection is cured. It will also help prevent the growth of antibiotic-resistant bacteria. Always take the exact amount that the label says to take. If the label says to take the medicine at a certain time, follow those directions. You might feel better after you take an antibiotic for a few days. But it is important to keep taking it for as long as prescribed. That will help you get rid of those bacteria that are a bit stronger and that survive the first few days of treatment. Where can you learn more? Go to http://edenilson-jocy.info/. Enter F695 in the search box to learn more about \"Learning About the Safe Use of Antibiotics. \"  Current as of: July 30, 2018  Content Version: 11.9  © 6032-1195 [employer]. Care instructions adapted under license by [employer] (which disclaims liability or warranty for this information). If you have questions about a medical condition or this instruction, always ask your healthcare professional. [place] [address] any warranty or liability for your use of this information. Patient Education        Saline Nasal Washes: Care Instructions  Your Care Instructions  Saline nasal washes help keep the nasal passages open by washing out thick or dried mucus. This simple remedy can help relieve symptoms of allergies, sinusitis, and colds. It also can make the nose feel more comfortable by keeping the mucous membranes moist. You may notice a little burning sensation in your nose the first few times you use the solution, but this usually gets better in a few days. Follow-up care is a key part of your treatment and safety.  Be sure to make and go to all appointments, and call your doctor if you are having problems. It's also a good idea to know your test results and keep a list of the medicines you take. How can you care for yourself at home? · You can buy premixed saline solution in a squeeze bottle or other sinus rinse products at a drugstore. Read and follow the instructions on the label. · You also can make your own saline solution by adding 1 teaspoon of salt and 1 teaspoon of baking soda to 2 cups of distilled water. · If you use a homemade solution, pour a small amount into a clean bowl. Using a rubber bulb syringe, squeeze the syringe and place the tip in the salt water. Pull a small amount of the salt water into the syringe by relaxing your hand. · Sit down with your head tilted slightly back. Do not lie down. Put the tip of the bulb syringe or the squeeze bottle a little way into one of your nostrils. Gently drip or squirt a few drops into the nostril. Repeat with the other nostril. Some sneezing and gagging are normal at first.  · Gently blow your nose. · Wipe the syringe or bottle tip clean after each use. · Repeat this 2 or 3 times a day. · Use nasal washes gently if you have nosebleeds often. When should you call for help? Watch closely for changes in your health, and be sure to contact your doctor if:    · You often get nosebleeds.     · You have problems doing the nasal washes. Where can you learn more? Go to http://edenilson-jocy.info/. Enter 925 [id number] [id number] in the search box to learn more about \"Saline Nasal Washes: Care Instructions. \"  Current as of: March 27, 2018  Content Version: 11.9  © 3569-5207 [employer]. Care instructions adapted under license by [employer] (which disclaims liability or warranty for this information). If you have questions about a medical condition or this instruction, always ask your healthcare professional. [address]  any warranty or liability for your use of this information.          Patient Education        Upper Respiratory Infection (Cold): Care Instructions  Your Care Instructions    An upper respiratory infection, or URI, is an infection of the nose, sinuses, or throat. URIs are spread by coughs, sneezes, and direct contact. The common cold is the most frequent kind of URI. The flu and sinus infections are other kinds of URIs. Almost all URIs are caused by viruses. Antibiotics won't cure them. But you can treat most infections with home care. This may include drinking lots of fluids and taking over-the-counter pain medicine. You will probably feel better in 4 to 10 days. The doctor has checked you carefully, but problems can develop later. If you notice any problems or new symptoms, get medical treatment right away. Follow-up care is a key part of your treatment and safety. Be sure to make and go to all appointments, and call your doctor if you are having problems. It's also a good idea to know your test results and keep a list of the medicines you take. How can you care for yourself at home? · To prevent dehydration, drink plenty of fluids, enough so that your urine is light yellow or clear like water. Choose water and other caffeine-free clear liquids until you feel better. If you have kidney, heart, or liver disease and have to limit fluids, talk with your doctor before you increase the amount of fluids you drink. · Take an over-the-counter pain medicine, such as acetaminophen (Tylenol), ibuprofen (Advil, Motrin), or naproxen (Aleve). Read and follow all instructions on the label. · Before you use cough and cold medicines, check the label. These medicines may not be safe for young children or for people with certain health problems. · Be careful when taking over-the-counter cold or flu medicines and Tylenol at the same time. Many of these medicines have acetaminophen, which is Tylenol. Read the labels to make sure that you are not taking more than the recommended dose.  Too much acetaminophen (Tylenol) can be harmful. · Get plenty of rest.  · Do not smoke or allow others to smoke around you. If you need help quitting, talk to your doctor about stop-smoking programs and medicines. These can increase your chances of quitting for good. When should you call for help? Call 911 anytime you think you may need emergency care. For example, call if:    · You have severe trouble breathing.    Call your doctor now or seek immediate medical care if:    · You seem to be getting much sicker.     · You have new or worse trouble breathing.     · You have a new or higher fever.     · You have a new rash.    Watch closely for changes in your health, and be sure to contact your doctor if:    · You have a new symptom, such as a sore throat, an earache, or sinus pain.     · You cough more deeply or more often, especially if you notice more mucus or a change in the color of your mucus.     · You do not get better as expected. Where can you learn more? Go to http://edenilson-jocy.info/. Enter U016 in the search box to learn more about \"Upper Respiratory Infection (Cold): Care Instructions. \"  Current as of: September 5, 2018  Content Version: 11.9  © 2568-2220 [employer]. Care instructions adapted under license by [employer] [place] (which disclaims liability or warranty for this information). If you have questions about a medical condition or this instruction, always ask your healthcare professional. [place] [address] any warranty or liability for your use of this information.

## 2019-04-02 NOTE — ED TRIAGE NOTES
Pt states she has been having throat pain and has been having general body aches. States this started yesterday

## 2019-04-02 NOTE — ED NOTES
I have reviewed discharge instructions with the patient. The patient verbalized understanding. Patient left ED via Discharge Method: ambulatory to Home with self. Opportunity for questions and clarification provided. Patient given 3 scripts. To continue your aftercare when you leave the hospital, you may receive an automated call from our care team to check in on how you are doing. This is a free service and part of our promise to provide the best care and service to meet your aftercare needs.  If you have questions, or wish to unsubscribe from this service please call [phone]. Thank you for Choosing our [facility] Emergency Department.

## 2019-04-04 LAB
BACTERIA SPEC CULT: NORMAL
SERVICE CMNT-IMP: NORMAL

## 2019-04-29 NOTE — LETTER
Physical Therapy Treatment     ASSESSMENT:   Patient seen on the 6th floor nursing unit.  Patient presents significantly below baseline,which was modified independent with mobility and uses a 4ww at all times. For safe return to prior living situation the patient needs to be modified independent  for overall mobility. Pt lives with son and daughter in law that work during the day. Pt has 2 stoop steps to enter home.  Pt has hx of x 1 fall this year resulting in left femur fx s/p ORIF.    Today's treatment focused on body mechanics and back safety and posture with functional mobility.  The patient is demonstrating good progress as evidenced by ability to progress gait to ~150 ft with ww, adjusted ww to lower height for improved posture and ww proximity. Pt instructed on log roll technique for bed mobiltiy and rationale and required min assist for LE's for proper technique.  At this time the patient continues to demonstrate impairments in activity tolerance, limited knowledge of compensatory strategies and postural control which is limiting the performance of bed mobility. Patient is currently functioning at minimal assist  for bed mobility. Further skilled physical therapy services are reasonable and necessary to address the above impairments and performance deficits.    PT Identified Barriers to Discharge: no MARIELOS benefits, general weakness       PLAN AND RECOMMENDATIONS:   Objective Measures Impacting Discharge Recommendation:   No objective measures completed this session    Recommendations for Discharge:  Recommendation for Discharge: PT: Home, Home therapy      Plan:   Continue skilled PT, including the following Treatment/Interventions: Functional transfer training;Strengthening;Endurance training;Patient/Family training;Equipment eval/education;Bed mobility;Gait training;Compensatory technique education;Stairs retraining;Safety Education;Neuromuscular re-education (04/25/19 9970)      ,   Frequency Comments:  22 [facility] EMERGENCY DEPT [address] [address] [phone] Work/School Note Date: 2/7/2018 To Whom It May concern: 
 
Ondina Phillip was seen and treated today in the emergency room by the following provider(s): 
Attending Provider: Renetta Gao MD. Ondina Phillip may return to work on 2/8/2018.  
 
Sincerely, 
 
 
 
 
Evangelist Garcia RN 
 
 
 
 M-F (BID ordered by MD) (H/HT) DH, MS, RL. Coordinate with pain meds (04/28/19 1531)     Treatment Plan for Next Session: focus on full bed mobility, body mechanics              DIAGNOSIS:   1. Generalized weakness    2. Hypervolemia, unspecified hypervolemia type           PRECAUTIONS:   Precautions  Other Precautions: hx of fall, with previous left hip fx, spine compression fx's, ESRD-dialysis MWF       EDUCATION:   On this date, the patient was educated on safe use of equipment.  The response to education was: Needs reinforcement.         Discussed with Patient the need for adequate help at home upon discharge.  Patient currently has sufficient help at their previous living situation.  Please see above for discharge recommendations.     SUBJECTIVE:   Subjective: Pt agreeable to therapy (04/29/19 1042)       OBJECTIVE:   Bed Mobility:    Bed Mobility  Supine to Sit: Minimal Assist (Min) (04/29/19 1042)  Sit to Supine: Minimal Assist (Min) (04/29/19 1042)  Bed Mobility Comments: instructed on log roll for proper body mechanics and back protection, assist for LEs (04/29/19 1042)     Transfers:    Transfers  Sit to Stand: Modified Independent (04/29/19 1042)  Stand to Sit: Modified Independent (04/29/19 1042)  Assistive Device/: 2-wheeled walker (04/29/19 1042)  Transfer Comments 1: recliner, EOB x 2, instructed on symmetrical movements with reaching for back protection (04/29/19 1042)      Gait:    Gait  Gait Assistance: Supervision (Supv) (04/29/19 1042)  Assistive Device/: 2-wheeled walker (04/29/19 1042)  Ambulation Distance (Feet): 150 Feet (04/29/19 1042)  Gait Comments 1: +90 ft, x 1 seated rest break while PT obtained shorter ww for pt for improved upright posture (04/29/19 1042)       Stairs:    Stairs Mobility  Number of Stairs: 1x5 (4\" steps as pt has at home) (04/28/19 1531)  Stair Management Assistance: Supervision (Supv) (04/28/19 1531)  Stair Management Technique: With walker  (04/28/19 1531)  Stairs Mobility Comments: good recall of sequencing with consistent strength (04/28/19 1531)     EQUIPMENT   PT/OT Mobility Equipment for Discharge: owns 4WW (04/26/19 8655)  PT/OT ADL Equipment for Discharge: pt has leg- and reacher at home (04/29/19 4008)     GOALS:   Short Term Goals to Be Reviewed On: 05/02/19 (04/25/19 0752)  Short Term Goals = Discharge Goals: No (04/25/19 0752)  Goal Agreement: Patient agrees with goals and treatment plan (04/25/19 0752)  Bed Mobility Discharge Goal: Pt to demo supine<>sit with modified independence (04/25/19 0752)  Transfer Short Term Goal: Pt to demo sit<>stand with ww with min assist (04/25/19 0752)  Transfer Discharge Goal: Pt to demo sit<>stand with ww with modfiied independence (04/25/19 0752)  Ambulation Short Term Goal: Pt to ambulate 10 ft with ww with mod assist (04/25/19 0752)  Ambulation Discharge Goal: Pt to ambulate 50ft with ww with modified independence (04/25/19 0752)  Stairs Discharge Goal: Pt to negotiate 2 stoop steps with min assist (04/25/19 0752)       BILLING INFORMATION:   Total Treatment Time: PT Time Spent: 25 minutes   Timed Treatment Minutes: OBS Patients Only:  PT Timed Code TX Minutes: 25 minutes

## 2021-04-08 ENCOUNTER — APPOINTMENT (OUTPATIENT)
Dept: GENERAL RADIOLOGY | Age: 26
End: 2021-04-08
Attending: EMERGENCY MEDICINE
Payer: MEDICAID

## 2021-04-08 ENCOUNTER — HOSPITAL ENCOUNTER (EMERGENCY)
Age: 26
Discharge: HOME | End: 2021-04-08
Attending: EMERGENCY MEDICINE
Payer: MEDICAID

## 2021-04-08 VITALS
OXYGEN SATURATION: 100 % | HEART RATE: 102 BPM | BODY MASS INDEX: 36.16 KG/M2 | HEIGHT: 66 IN | SYSTOLIC BLOOD PRESSURE: 142 MMHG | TEMPERATURE: 102.2 F | WEIGHT: 225 LBS | DIASTOLIC BLOOD PRESSURE: 60 MMHG | RESPIRATION RATE: 22 BRPM

## 2021-04-08 DIAGNOSIS — J02.0 ACUTE STREPTOCOCCAL PHARYNGITIS: Primary | ICD-10-CM

## 2021-04-08 LAB
ALBUMIN SERPL-MCNC: 3.5 G/DL (ref 3.5–5)
ALBUMIN/GLOB SERPL: 0.7 {RATIO} (ref 1.2–3.5)
ALP SERPL-CCNC: 73 U/L (ref 50–136)
ALT SERPL-CCNC: 17 U/L (ref 12–65)
ANION GAP SERPL CALC-SCNC: 7 MMOL/L (ref 7–16)
AST SERPL-CCNC: 8 U/L (ref 15–37)
BACTERIA URNS QL MICRO: 0 /HPF
BASOPHILS # BLD: 0 K/UL (ref 0–0.2)
BASOPHILS NFR BLD: 0 % (ref 0–2)
BILIRUB SERPL-MCNC: 0.7 MG/DL (ref 0.2–1.1)
BUN SERPL-MCNC: 6 MG/DL (ref 6–23)
CALCIUM SERPL-MCNC: 9.4 MG/DL (ref 8.3–10.4)
CASTS URNS QL MICRO: NORMAL /LPF
CHLORIDE SERPL-SCNC: 108 MMOL/L (ref 98–107)
CO2 SERPL-SCNC: 25 MMOL/L (ref 21–32)
CREAT SERPL-MCNC: 0.82 MG/DL (ref 0.6–1)
DIFFERENTIAL METHOD BLD: ABNORMAL
EOSINOPHIL # BLD: 0 K/UL (ref 0–0.8)
EOSINOPHIL NFR BLD: 0 % (ref 0.5–7.8)
EPI CELLS #/AREA URNS HPF: NORMAL /HPF
ERYTHROCYTE [DISTWIDTH] IN BLOOD BY AUTOMATED COUNT: 17.8 % (ref 11.9–14.6)
GLOBULIN SER CALC-MCNC: 4.8 G/DL (ref 2.3–3.5)
GLUCOSE SERPL-MCNC: 122 MG/DL (ref 65–100)
HCG UR QL: NEGATIVE
HCT VFR BLD AUTO: 28.5 % (ref 35.8–46.3)
HETEROPH AB SER QL: NEGATIVE
HGB BLD-MCNC: 8.4 G/DL (ref 11.7–15.4)
IMM GRANULOCYTES # BLD AUTO: 0 K/UL (ref 0–0.5)
IMM GRANULOCYTES NFR BLD AUTO: 0 % (ref 0–5)
LACTATE SERPL-SCNC: 1.1 MMOL/L (ref 0.4–2)
LYMPHOCYTES # BLD: 0.9 K/UL (ref 0.5–4.6)
LYMPHOCYTES NFR BLD: 8 % (ref 13–44)
MCH RBC QN AUTO: 20.7 PG (ref 26.1–32.9)
MCHC RBC AUTO-ENTMCNC: 29.5 G/DL (ref 31.4–35)
MCV RBC AUTO: 70.4 FL (ref 79.6–97.8)
MONOCYTES # BLD: 0.6 K/UL (ref 0.1–1.3)
MONOCYTES NFR BLD: 5 % (ref 4–12)
NEUTS SEG # BLD: 9.2 K/UL (ref 1.7–8.2)
NEUTS SEG NFR BLD: 86 % (ref 43–78)
NRBC # BLD: 0 K/UL (ref 0–0.2)
PLATELET # BLD AUTO: 426 K/UL (ref 150–450)
PMV BLD AUTO: 10.2 FL (ref 9.4–12.3)
POTASSIUM SERPL-SCNC: 3.4 MMOL/L (ref 3.5–5.1)
PROT SERPL-MCNC: 8.3 G/DL (ref 6.3–8.2)
RBC # BLD AUTO: 4.05 M/UL (ref 4.05–5.2)
RBC #/AREA URNS HPF: NORMAL /HPF
SODIUM SERPL-SCNC: 140 MMOL/L (ref 136–145)
STREP,MOLECULAR STRPM: DETECTED
WBC # BLD AUTO: 10.7 K/UL (ref 4.3–11.1)
WBC URNS QL MICRO: NORMAL /HPF

## 2021-04-08 PROCEDURE — 93005 ELECTROCARDIOGRAM TRACING: CPT | Performed by: EMERGENCY MEDICINE

## 2021-04-08 PROCEDURE — 86308 HETEROPHILE ANTIBODY SCREEN: CPT

## 2021-04-08 PROCEDURE — 99285 EMERGENCY DEPT VISIT HI MDM: CPT

## 2021-04-08 PROCEDURE — 87651 STREP A DNA AMP PROBE: CPT

## 2021-04-08 PROCEDURE — 74011250637 HC RX REV CODE- 250/637: Performed by: EMERGENCY MEDICINE

## 2021-04-08 PROCEDURE — 74011250636 HC RX REV CODE- 250/636: Performed by: EMERGENCY MEDICINE

## 2021-04-08 PROCEDURE — 83605 ASSAY OF LACTIC ACID: CPT

## 2021-04-08 PROCEDURE — 85025 COMPLETE CBC W/AUTO DIFF WBC: CPT

## 2021-04-08 PROCEDURE — 71045 X-RAY EXAM CHEST 1 VIEW: CPT

## 2021-04-08 PROCEDURE — 81003 URINALYSIS AUTO W/O SCOPE: CPT

## 2021-04-08 PROCEDURE — 87040 BLOOD CULTURE FOR BACTERIA: CPT

## 2021-04-08 PROCEDURE — 80053 COMPREHEN METABOLIC PANEL: CPT

## 2021-04-08 PROCEDURE — 81015 MICROSCOPIC EXAM OF URINE: CPT

## 2021-04-08 PROCEDURE — 81025 URINE PREGNANCY TEST: CPT

## 2021-04-08 RX ORDER — IBUPROFEN 800 MG/1
800 TABLET ORAL
Status: COMPLETED | OUTPATIENT
Start: 2021-04-08 | End: 2021-04-08

## 2021-04-08 RX ORDER — DICLOFENAC SODIUM 75 MG/1
75 TABLET, DELAYED RELEASE ORAL 2 TIMES DAILY
Qty: 20 TAB | Refills: 0 | Status: SHIPPED | OUTPATIENT
Start: 2021-04-08

## 2021-04-08 RX ORDER — PENICILLIN V POTASSIUM 250 MG/1
500 TABLET, FILM COATED ORAL
Status: COMPLETED | OUTPATIENT
Start: 2021-04-08 | End: 2021-04-08

## 2021-04-08 RX ORDER — PENICILLIN V POTASSIUM 500 MG/1
500 TABLET, FILM COATED ORAL 4 TIMES DAILY
Qty: 28 TAB | Refills: 0 | Status: SHIPPED | OUTPATIENT
Start: 2021-04-08 | End: 2021-04-15

## 2021-04-08 RX ADMIN — SODIUM CHLORIDE, SODIUM LACTATE, POTASSIUM CHLORIDE, AND CALCIUM CHLORIDE 1000 ML: 600; 310; 30; 20 INJECTION, SOLUTION INTRAVENOUS at 18:22

## 2021-04-08 RX ADMIN — PENICILLIN V POTASIUM 500 MG: 250 TABLET ORAL at 21:38

## 2021-04-08 RX ADMIN — IBUPROFEN 800 MG: 800 TABLET, FILM COATED ORAL at 20:04

## 2021-04-08 NOTE — LETTER
[address] [facility] EMERGENCY DEPT   [facility] [id number] [phone]    Work/School Note    Date: 4/8/2021    To Whom It May concern:    Rohan Hodge was seen and treated today in the emergency room by the following provider(s):  Attending Provider: Marian Perez DO.      Toddpaul Hodge {Return to school/sport/work:4/10/2021  Sincerely,          Srinivas Schofield RN

## 2021-04-08 NOTE — ED TRIAGE NOTES
Pt ambulatory to triage wearing mask. reports generalized pain reports sore throat nausea and fever. Denies cough and SOB. Denies loss of taste or smell.  Throat pink with no exudate noted

## 2021-04-09 LAB
ATRIAL RATE: 113 BPM
CALCULATED P AXIS, ECG09: 62 DEGREES
CALCULATED R AXIS, ECG10: 23 DEGREES
CALCULATED T AXIS, ECG11: 33 DEGREES
DIAGNOSIS, 93000: NORMAL
P-R INTERVAL, ECG05: 114 MS
Q-T INTERVAL, ECG07: 312 MS
QRS DURATION, ECG06: 76 MS
QTC CALCULATION (BEZET), ECG08: 427 MS
VENTRICULAR RATE, ECG03: 113 BPM

## 2021-04-09 NOTE — ED NOTES
I have reviewed discharge instructions with the patient. The patient verbalized understanding. Patient left ED via Discharge Method: ambulatory to SNF with friend, self). Opportunity for questions and clarification provided. Patient given 2 scripts. To continue your aftercare when you leave the hospital, you may receive an automated call from our care team to check in on how you are doing. This is a free service and part of our promise to provide the best care and service to meet your aftercare needs.  If you have questions, or wish to unsubscribe from this service please call [phone]. Thank you for Choosing our [facility] Emergency Department.

## 2021-04-09 NOTE — ED NOTES
Pt presents to the ED for c/o sore throat, fever , generlized body aches for several days.   Has not been taking anything for fever or body aches

## 2021-04-13 LAB
BACTERIA SPEC CULT: NORMAL
BACTERIA SPEC CULT: NORMAL
SERVICE CMNT-IMP: NORMAL
SERVICE CMNT-IMP: NORMAL

## 2022-01-02 ENCOUNTER — HOSPITAL ENCOUNTER (EMERGENCY)
Age: 27
Discharge: HOME | End: 2022-01-02
Attending: EMERGENCY MEDICINE
Payer: MEDICAID

## 2022-01-02 VITALS
DIASTOLIC BLOOD PRESSURE: 82 MMHG | BODY MASS INDEX: 38.8 KG/M2 | HEART RATE: 79 BPM | TEMPERATURE: 99.3 F | RESPIRATION RATE: 18 BRPM | HEIGHT: 69 IN | WEIGHT: 262 LBS | OXYGEN SATURATION: 99 % | SYSTOLIC BLOOD PRESSURE: 135 MMHG

## 2022-01-02 DIAGNOSIS — U07.1 COVID-19: Primary | ICD-10-CM

## 2022-01-02 LAB
COVID-19 RAPID TEST, COVR: DETECTED
SOURCE, COVRS: ABNORMAL

## 2022-01-02 PROCEDURE — M0239 BAMLANIVIMAB-XXXX INFUSION: HCPCS

## 2022-01-02 PROCEDURE — 87635 SARS-COV-2 COVID-19 AMP PRB: CPT

## 2022-01-02 PROCEDURE — 74011000258 HC RX REV CODE- 258: Performed by: EMERGENCY MEDICINE

## 2022-01-02 PROCEDURE — 99284 EMERGENCY DEPT VISIT MOD MDM: CPT

## 2022-01-02 PROCEDURE — 74011000636 HC RX REV CODE- 636: Performed by: EMERGENCY MEDICINE

## 2022-01-02 PROCEDURE — M0245 HC BAMLAN AND ETESEV INFUSION: HCPCS

## 2022-01-02 RX ADMIN — SODIUM CHLORIDE: 9 INJECTION, SOLUTION INTRAVENOUS at 13:48

## 2022-01-02 NOTE — ED PROVIDER NOTES
The history is provided by the patient. Positive For Covid-19  Temp source:  Subjective  Severity:  Moderate  Onset quality:  Gradual  Duration:  7 days  Timing:  Constant  Progression:  Waxing and waning  Chronicity:  New  Relieved by:  Nothing  Worsened by:  Laying down  Associated symptoms: chills, congestion, cough, nausea, rhinorrhea and sore throat    Associated symptoms: no chest pain, no diarrhea, no dysuria, no ear pain, no headaches, no myalgias, no rash and no vomiting         No past medical history on file. Past Surgical History:   Procedure Laterality Date    HX GYN      c section    HX ORTHOPAEDIC      left ankle         No family history on file. Social History     Socioeconomic History    Marital status: SINGLE     Spouse name: Not on file    Number of children: Not on file    Years of education: Not on file    Highest education level: Not on file   Occupational History    Not on file   Tobacco Use    Smoking status: Never Smoker    Smokeless tobacco: Never Used   Substance and Sexual Activity    Alcohol use: No    Drug use: No    Sexual activity: Not on file   Other Topics Concern    Not on file   Social History Narrative    Not on file     Social Determinants of Health     Financial Resource Strain:     Difficulty of Paying Living Expenses: Not on file   Food Insecurity:     Worried About Running Out of Food in the Last Year: Not on file    Noah of Food in the Last Year: Not on file   Transportation Needs:     Lack of Transportation (Medical): Not on file    Lack of Transportation (Non-Medical):  Not on file   Physical Activity:     Days of Exercise per Week: Not on file    Minutes of Exercise per Session: Not on file   Stress:     Feeling of Stress : Not on file   Social Connections:     Frequency of Communication with Friends and Family: Not on file    Frequency of Social Gatherings with Friends and Family: Not on file    Attends Judaism Services: Not on file  Active Member of Clubs or Organizations: Not on file    Attends Club or Organization Meetings: Not on file    Marital Status: Not on file   Intimate Partner Violence:     Fear of Current or Ex-Partner: Not on file    Emotionally Abused: Not on file    Physically Abused: Not on file    Sexually Abused: Not on file   Housing Stability:     Unable to Pay for Housing in the Last Year: Not on file    Number of [place] in the Last Year: Not on file    Unstable Housing in the Last Year: Not on file         ALLERGIES: Patient has no known allergies. Review of Systems   Constitutional: Positive for chills. Negative for fever. HENT: Positive for congestion, rhinorrhea and sore throat. Negative for ear pain. Eyes: Negative for photophobia and discharge. Respiratory: Positive for cough. Negative for shortness of breath. Cardiovascular: Negative for chest pain and palpitations. Gastrointestinal: Positive for nausea. Negative for abdominal pain, constipation, diarrhea and vomiting. Endocrine: Negative for cold intolerance and heat intolerance. Genitourinary: Negative for dysuria and flank pain. Musculoskeletal: Negative for arthralgias, myalgias and neck pain. Skin: Negative for rash and wound. Allergic/Immunologic: Negative for environmental allergies and food allergies. Neurological: Negative for syncope and headaches. Hematological: Negative for adenopathy. Does not bruise/bleed easily. Psychiatric/Behavioral: Negative for dysphoric mood. The patient is not nervous/anxious. All other systems reviewed and are negative. Vitals:    01/02/22 1123 01/02/22 1238 01/02/22 1239   BP: 130/76 137/79    Pulse: 79     Resp: 18     Temp: 99.3 °F (37.4 °C)     SpO2: 99%  99%   Weight: 118.8 kg (262 lb)     Height: 5' 9\" (1.753 m)              Physical Exam  Vitals and nursing note reviewed. Constitutional:       General: She is in acute distress. Appearance: Normal appearance. She is well-developed. She is obese. HENT:      Head: Normocephalic and atraumatic. Right Ear: External ear normal.      Left Ear: External ear normal.      Nose: Nose normal.      Mouth/Throat:      Mouth: Mucous membranes are moist.      Pharynx: Oropharynx is clear. Eyes:      Extraocular Movements: Extraocular movements intact. Conjunctiva/sclera: Conjunctivae normal.      Pupils: Pupils are equal, round, and reactive to light. Neck:      Vascular: No JVD. Cardiovascular:      Rate and Rhythm: Normal rate and regular rhythm. Pulses: Normal pulses. Heart sounds: Normal heart sounds. No murmur heard. No friction rub. No gallop. Pulmonary:      Effort: Pulmonary effort is normal.      Breath sounds: Normal breath sounds. Abdominal:      General: Bowel sounds are normal. There is no distension. Palpations: Abdomen is soft. There is no mass. Tenderness: There is no abdominal tenderness. Musculoskeletal:         General: No deformity. Normal range of motion. Cervical back: Normal range of motion and neck supple. Skin:     General: Skin is warm and dry. Capillary Refill: Capillary refill takes less than 2 seconds. Findings: No rash. Neurological:      General: No focal deficit present. Mental Status: She is alert and oriented to person, place, and time. Cranial Nerves: No cranial nerve deficit. Sensory: No sensory deficit. Gait: Gait normal.   Psychiatric:         Mood and Affect: Mood normal.         Speech: Speech normal.         Behavior: Behavior normal.         Thought Content:  Thought content normal.         Judgment: Judgment normal.          MDM  Number of Diagnoses or Management Options  COVID-19: new and requires workup  Diagnosis management comments: 58-year-old female Covid positive today  No hypoxia    Qualifies for Regeneron based on BMI    Anticipate discharge after infusion       Amount and/or Complexity of Data Reviewed  Clinical lab tests: ordered and reviewed  Tests in the medicine section of CPT®: ordered and reviewed  Decide to obtain previous medical records or to obtain history from someone other than the patient: yes  Review and summarize past medical records: yes    Risk of Complications, Morbidity, and/or Mortality  Presenting problems: moderate  Diagnostic procedures: moderate  Management options: moderate  General comments: Elements of this note have been dictated via voice recognition software. Text and phrases may be limited by the accuracy of the software. The chart has been reviewed, but errors may still be present.       Patient Progress  Patient progress: stable         Procedures

## 2022-01-02 NOTE — Clinical Note
[address] [facility] EMERGENCY DEPT   [facility]  [place] [facility] [id number] [phone]    Work/School Note    Date: 1/2/2022     To Whom It May concern:    Benjamin Costa was evaluated by the following provider(s):  Attending Provider: Pedro Pablo Orantes, [address] [place] [address] virus is suspected. Per the CDC guidelines we recommend home isolation until the following conditions are all met:    1. At least five days have passed since symptoms first appeared and/or had a close exposure,   2. After home isolation for five days, wearing a mask around others for the next five days,  3. At least 24 have passed since last fever without the use of fever-reducing medications and  4.  Symptoms (eg cough, shortness of breath) have improved      Sincerely,          Eusebio Lowery RN

## 2022-01-02 NOTE — DISCHARGE INSTRUCTIONS
Use tylenol and motrin for fever control.   Alternate doses of each at three hour intervals for temperature over 100.4 F  Drink plenty of fluids  Follow Covid isolation and social distancing protocols  Call your doctor/follow up doctor to set up appointment for recheck visit  Return to ER for any worsening symptoms or new problems which may arise

## 2022-01-02 NOTE — Clinical Note
[address] [facility] EMERGENCY DEPT   [facility] [address]  [place] [facility] [id number] [phone]    Work/School Note    Date: 1/2/2022     To Whom It May concern:    Burgess Reddy was evaluated by the following provider(s):  Attending Provider: Will Gotti [address] virus is suspected. Per the CDC guidelines we recommend home isolation until the following conditions are all met:    1. At least five days have passed since symptoms first appeared and/or had a close exposure,   2. After home isolation for five days, wearing a mask around others for the next five days,  3. At least 24 have passed since last fever without the use of fever-reducing medications and  4.  Symptoms (eg cough, shortness of breath) have improved      Sincerely,          Veena Rider MD

## 2022-01-02 NOTE — ED NOTES
I have reviewed discharge instructions with the patient. The patient verbalized understanding. Patient left ED via Discharge Method: ambulatory to Home. Opportunity for questions and clarification provided. Patient given 0 scripts. To continue your aftercare when you leave the hospital, you may receive an automated call from our care team to check in on how you are doing. This is a free service and part of our promise to provide the best care and service to meet your aftercare needs.  If you have questions, or wish to unsubscribe from this service please call [phone]. Thank you for Choosing our [facility] Emergency Department.

## 2022-01-02 NOTE — Clinical Note
[address] [facility] EMERGENCY DEPT   [facility] [address] [phone]    Work/School Note    Date: 1/2/2022     To Whom It May concern:    Zohra Jefferson was evaluated by the following provider(s):  Attending Provider: Leona Pang [address] [place] [address] virus is suspected. Per the CDC guidelines we recommend home isolation until the following conditions are all met:    1. At least five days have passed since symptoms first appeared and/or had a close exposure,   2. After home isolation for five days, wearing a mask around others for the next five days,  3. At least 24 have passed since last fever without the use of fever-reducing medications and  4.  Symptoms (eg cough, shortness of breath) have improved      Sincerely,          Desiree Maria MD

## 2022-01-02 NOTE — ED TRIAGE NOTES
Pt ambulatory unassisted to triage with mask in place. Pt complains of cough, fevers, generalized body aches onset 12/26/21. Unvaccinated for Covid-19.

## 2022-07-20 ENCOUNTER — APPOINTMENT (OUTPATIENT)
Dept: GENERAL RADIOLOGY | Age: 27
End: 2022-07-20
Payer: MEDICAID

## 2022-07-20 ENCOUNTER — HOSPITAL ENCOUNTER (EMERGENCY)
Age: 27
Discharge: HOME | End: 2022-07-20
Attending: EMERGENCY MEDICINE
Payer: MEDICAID

## 2022-07-20 VITALS
OXYGEN SATURATION: 100 % | SYSTOLIC BLOOD PRESSURE: 134 MMHG | DIASTOLIC BLOOD PRESSURE: 73 MMHG | RESPIRATION RATE: 20 BRPM | HEART RATE: 79 BPM | TEMPERATURE: 98.7 F

## 2022-07-20 DIAGNOSIS — U07.1 COVID-19: Primary | ICD-10-CM

## 2022-07-20 LAB
SARS-COV-2 RDRP RESP QL NAA+PROBE: DETECTED
SOURCE: ABNORMAL

## 2022-07-20 PROCEDURE — 71046 X-RAY EXAM CHEST 2 VIEWS: CPT

## 2022-07-20 PROCEDURE — 87635 SARS-COV-2 COVID-19 AMP PRB: CPT

## 2022-07-20 PROCEDURE — 99284 EMERGENCY DEPT VISIT MOD MDM: CPT

## 2022-07-20 ASSESSMENT — ENCOUNTER SYMPTOMS
COUGH: 1
SHORTNESS OF BREATH: 0
SORE THROAT: 1
ABDOMINAL PAIN: 0

## 2022-07-20 ASSESSMENT — PAIN SCALES - GENERAL: PAINLEVEL_OUTOF10: 7

## 2022-07-20 ASSESSMENT — PAIN - FUNCTIONAL ASSESSMENT: PAIN_FUNCTIONAL_ASSESSMENT: 0-10

## 2022-07-21 ENCOUNTER — CARE COORDINATION (OUTPATIENT)
Dept: OTHER | Facility: CLINIC | Age: 27
End: 2022-07-21

## 2022-07-21 NOTE — ED PROVIDER NOTES
[facility] Emergency Department Provider Note                   PCP:                ISABELA Echols               Age: 31 y.o. Sex: female       ICD-10-CM    1. COVID-19  U5.3           DISPOSITION Decision To Discharge 07/20/2022 10:03:14 PM        MDM  Number of Diagnoses or Management Options  COVID-19  Diagnosis management comments:     Overall patient is a well-appearing 48-year-old female presenting today reporting positive home COVID test today. Her vital signs are stable and her physical exam is reassuring. Patient does not meet criteria for monoclonal antibodies. We discussed the need to self quarantine at home, patient provided with work note and copy of her positive COVID test today, we discussed symptomatic treatment at home and red flag symptoms that would require emergent reevaluation. Amount and/or Complexity of Data Reviewed  Clinical lab tests: ordered and reviewed  Tests in the radiology section of CPT®: ordered and reviewed    Patient Progress  Patient progress: stable       Orders Placed This Encounter   Procedures    COVID-19, Rapid    XR CHEST (2 VW)        Brain Certain is a 31 y.o. female who presents to the Emergency Department with chief complaint of    Chief Complaint   Patient presents with    Positive For Covid-23     48-year-old well-appearing female presents today for evaluation of COVID-19 symptoms. Her symptoms began yesterday with congestion and itchy throat. She states she woke up this morning with body aches and has had body aches, chills, cough throughout the day today. Cough is nonproductive, no associated shortness of breath or chest pain. No abdominal pain, nausea or vomiting. She does report associated headache. Denies any neck pain or stiffness. Denies receiving COVID-19 vaccine. She was around her boyfriend who was sick with similar symptoms. She took a home COVID test today that was positive.   Presents to our department today for evaluation of her symptoms and states that she will need a formal COVID test to present to her employer. The history is provided by the patient. No  was used. Review of Systems   Constitutional:  Positive for chills and fever. Negative for activity change. HENT:  Positive for congestion and sore throat. Respiratory:  Positive for cough. Negative for shortness of breath. Gastrointestinal:  Negative for abdominal pain. Musculoskeletal:  Positive for arthralgias. Negative for neck pain. Neurological:  Positive for headaches. History reviewed. No pertinent past medical history. Past Surgical History:   Procedure Laterality Date    GYN      c section    ORTHOPEDIC SURGERY      left ankle        History reviewed. No pertinent family history. Social History     Socioeconomic History    Marital status: Single     Spouse name: None    Number of children: None    Years of education: None    Highest education level: None   Tobacco Use    Smoking status: Never    Smokeless tobacco: Never   Substance and Sexual Activity    Alcohol use: No    Drug use: No         Patient has no known allergies. Discharge Medication List as of 7/20/2022 10:18 PM        CONTINUE these medications which have NOT CHANGED    Details   !! diclofenac (VOLTAREN) 50 MG EC tablet Take 50 mg by mouth 2 times dailyHistorical Med      !! diclofenac (VOLTAREN) 75 MG EC tablet Take 75 mg by mouth 2 times dailyHistorical Med      HYDROcodone-acetaminophen (NORCO) 5-325 MG per tablet Take 1 tablet by mouth every 8 hours as needed. Historical Med      ondansetron (ZOFRAN-ODT) 4 MG disintegrating tablet Take 4 mg by mouth every 8 hours as neededHistorical Med      promethazine (PHENERGAN) 25 MG tablet Take 25 mg by mouth every 6 hours as neededHistorical Med       !! - Potential duplicate medications found. Please discuss with provider. Vitals signs and nursing note reviewed.    Patient Vitals for the past 4 hrs:   Temp Pulse Resp BP SpO2   07/20/22 2109 98.7 °F (37.1 °C) 79 20 134/73 100 %          Physical Exam  Vitals and nursing note reviewed. Constitutional:       Appearance: Normal appearance. HENT:      Head: Normocephalic and atraumatic. Right Ear: Tympanic membrane and ear canal normal.      Left Ear: Tympanic membrane and ear canal normal.      Mouth/Throat:      Mouth: Mucous membranes are moist.      Pharynx: Oropharynx is clear. No oropharyngeal exudate. Eyes:      Conjunctiva/sclera: Conjunctivae normal.   Cardiovascular:      Rate and Rhythm: Normal rate and regular rhythm. Heart sounds: No murmur heard. No friction rub. No gallop. Pulmonary:      Effort: Pulmonary effort is normal.      Breath sounds: No wheezing, rhonchi or rales. Abdominal:      Palpations: Abdomen is soft. Tenderness: There is no abdominal tenderness. Skin:     General: Skin is warm and dry. Capillary Refill: Capillary refill takes less than 2 seconds. Neurological:      General: No focal deficit present. Mental Status: She is alert and oriented to person, place, and time. Psychiatric:         Mood and Affect: Mood normal.         Thought Content: Thought content normal.         Judgment: Judgment normal.        Procedures      Labs Reviewed   COVID-19, RAPID - Abnormal; Notable for the following components:       Result Value    SARS-CoV-2, Rapid Detected (*)     All other components within normal limits        XR CHEST (2 VW)   Final Result   No consolidation. ED Course as of 07/20/22 2258   Wed Jul 20, 2022   2202 FINDINGS:  The heart size is within normal limits. There is no lobar  consolidation, pleural effusions or pulmonary edema. IMPRESSION:  No consolidation. [KE]      ED Course User Index  [KE] ISABELA Gilbert        Voice dictation software was used during the making of this note.   This software is not perfect and grammatical and other typographical errors may be present. This note has not been completely proofread for errors.         Osvaldo Hollis  07/20/22 0628

## 2022-07-21 NOTE — CARE COORDINATION
Care Transitions Outreach Attempt    Call within 2 business days of discharge: Yes   Attempted to reach patient for transitions of care follow up. Unable to reach patient. Patient: Marielle Cardona Patient : 1995 MRN: [id number]    Last Discharge [facility]       Date Complaint Diagnosis Description Type Department Provider    22 Positive For Covid-19 COVID-19 ED (DISCHARGE) [facility] Martir Whittaker MD              Was this an external facility discharge? No Discharge Facility: N/A    Noted following upcoming appointments from discharge chart review:   [address] follow up appointment(s): No future appointments.   Non-[facility] follow up appointment(s):

## 2022-07-21 NOTE — DISCHARGE INSTRUCTIONS
Your COVID test was positive today. You do need to self quarantine for 5 days. If on day 5 you are asymptomatic, you can return to regular activities and wear a mask for an additional 5 days. If on day 5 you still have any symptoms present you will need to quarantine for a full 10 days. Follow-up with your PCP in 1 to 2 days if no improvement. Return to the ER for any new or worsening symptoms.

## 2022-07-22 ENCOUNTER — CARE COORDINATION (OUTPATIENT)
Dept: OTHER | Facility: CLINIC | Age: 27
End: 2022-07-22

## 2022-07-25 ENCOUNTER — CARE COORDINATION (OUTPATIENT)
Dept: OTHER | Facility: CLINIC | Age: 27
End: 2022-07-25

## 2023-12-14 NOTE — ED PROVIDER NOTES
Patient presents emerge department with a complaint of a sore throat back pain and headache that began this morning. There is associated fever. She denies nasal congestion, rhinorrhea and cough. She denies vomiting and diarrhea. She has had no known sick contacts. She denies dysuria. The history is provided by the patient. Generalized Body Aches  This is a new problem. The current episode started 6 to 12 hours ago. The problem occurs constantly. The problem has not changed since onset. Associated symptoms include headaches. Pertinent negatives include no chest pain, no abdominal pain and no shortness of breath. Nothing aggravates the symptoms. Nothing relieves the symptoms. She has tried nothing for the symptoms. The treatment provided no relief. No past medical history on file. Past Surgical History:   Procedure Laterality Date    HX GYN      c section    HX ORTHOPAEDIC      left ankle         No family history on file.     Social History     Socioeconomic History    Marital status: SINGLE     Spouse name: Not on file    Number of children: Not on file    Years of education: Not on file    Highest education level: Not on file   Occupational History    Not on file   Social Needs    Financial resource strain: Not on file    Food insecurity     Worry: Not on file     Inability: Not on file    Transportation needs     Medical: Not on file     Non-medical: Not on file   Tobacco Use    Smoking status: Never Smoker    Smokeless tobacco: Never Used   Substance and Sexual Activity    Alcohol use: No    Drug use: No    Sexual activity: Not on file   Lifestyle    Physical activity     Days per week: Not on file     Minutes per session: Not on file    Stress: Not on file   Relationships    Social connections     Talks on phone: Not on file     Gets together: Not on file     Attends Jehovah's witness service: Not on file     Active member of club or organization: Not on file     Attends meetings of clubs or organizations: Not on file     Relationship status: Not on file    Intimate partner violence     Fear of current or ex partner: Not on file     Emotionally abused: Not on file     Physically abused: Not on file     Forced sexual activity: Not on file   Other Topics Concern    Not on file   Social History Narrative    Not on file         ALLERGIES: Patient has no known allergies. Review of Systems   Constitutional: Positive for chills and fever. Negative for diaphoresis and fatigue. HENT: Positive for ear pain and sore throat. Negative for congestion, postnasal drip, rhinorrhea, sinus pressure, sinus pain, trouble swallowing and voice change. Respiratory: Negative for cough, chest tightness, shortness of breath and wheezing. Cardiovascular: Negative for chest pain. Gastrointestinal: Negative for abdominal pain. Genitourinary: Negative for dysuria and frequency. Musculoskeletal: Positive for myalgias. Neurological: Positive for headaches. All other systems reviewed and are negative. Vitals:    04/08/21 1804   BP: 110/61   Pulse: (!) 115   Resp: 22   Temp: (!) 102.2 °F (39 °C)   SpO2: 98%   Weight: 102.1 kg (225 lb)   Height: 5' 6\" (1.676 m)            Physical Exam  Vitals signs and nursing note reviewed. Constitutional:       General: She is not in acute distress. Appearance: Normal appearance. She is not ill-appearing, toxic-appearing or diaphoretic. HENT:      Head: Normocephalic. Right Ear: Tympanic membrane normal.      Left Ear: Tympanic membrane normal.      Nose: Nose normal. No congestion or rhinorrhea. Mouth/Throat:      Mouth: Mucous membranes are moist.      Pharynx: Oropharyngeal exudate and posterior oropharyngeal erythema present. Comments: Tonsils appear beefy red with some mild exudate. Eyes:      Extraocular Movements: Extraocular movements intact.       Conjunctiva/sclera: Conjunctivae normal.      Pupils: Pupils are equal, round, and reactive to light. Neck:      Musculoskeletal: Normal range of motion and neck supple. No neck rigidity or muscular tenderness. Cardiovascular:      Rate and Rhythm: Normal rate and regular rhythm. Pulses: Normal pulses. Heart sounds: Normal heart sounds. No murmur. No friction rub. Pulmonary:      Effort: Pulmonary effort is normal.      Breath sounds: Normal breath sounds. Abdominal:      Palpations: Abdomen is soft. Tenderness: There is no abdominal tenderness. There is no guarding or rebound. Musculoskeletal: Normal range of motion. General: No swelling or tenderness. Lymphadenopathy:      Cervical: No cervical adenopathy. Skin:     General: Skin is warm and dry. Capillary Refill: Capillary refill takes less than 2 seconds. Findings: No rash. Neurological:      General: No focal deficit present. Mental Status: She is alert and oriented to person, place, and time. Mental status is at baseline. Psychiatric:         Mood and Affect: Mood normal.         Behavior: Behavior normal.         Thought Content: Thought content normal.          MDM  Number of Diagnoses or Management Options  Diagnosis management comments: Given patient's fever of 102.2 heart rate of 115 sepsis protocols were initiated by standing order prior to my evaluation. She has received nothing for the fever during the interval wait.   History and physical findings appear consistent with probable strep pharyngitis       Amount and/or Complexity of Data Reviewed  Clinical lab tests: ordered and reviewed  Tests in the radiology section of CPT®: ordered and reviewed  Independent visualization of images, tracings, or specimens: yes    Risk of Complications, Morbidity, and/or Mortality  Presenting problems: moderate  Diagnostic procedures: moderate  Management options: moderate           Procedures - appears comfortable  - c/w IV ceftriaxone  - c/w IV vanco, monitor vanco levels

## 2024-03-23 ENCOUNTER — HOSPITAL ENCOUNTER (EMERGENCY)
Age: 29
Discharge: HOME | End: 2024-03-23
Payer: MEDICAID

## 2024-03-23 VITALS
BODY MASS INDEX: 45.04 KG/M2 | DIASTOLIC BLOOD PRESSURE: 84 MMHG | WEIGHT: 287 LBS | HEIGHT: 67 IN | HEART RATE: 91 BPM | RESPIRATION RATE: 17 BRPM | OXYGEN SATURATION: 99 % | SYSTOLIC BLOOD PRESSURE: 139 MMHG | TEMPERATURE: 98.6 F

## 2024-03-23 DIAGNOSIS — J02.0 STREP PHARYNGITIS: Primary | ICD-10-CM

## 2024-03-23 LAB
SARS-COV-2 RDRP RESP QL NAA+PROBE: NOT DETECTED
SOURCE: NORMAL
STREP, MOLECULAR: DETECTED

## 2024-03-23 PROCEDURE — 87651 STREP A DNA AMP PROBE: CPT

## 2024-03-23 PROCEDURE — 99283 EMERGENCY DEPT VISIT LOW MDM: CPT

## 2024-03-23 PROCEDURE — 87635 SARS-COV-2 COVID-19 AMP PRB: CPT

## 2024-03-23 RX ORDER — AMOXICILLIN 500 MG/1
500 CAPSULE ORAL 2 TIMES DAILY
Qty: 20 CAPSULE | Refills: 0 | Status: SHIPPED | OUTPATIENT
Start: 2024-03-23 | End: 2024-04-02

## 2024-03-23 ASSESSMENT — PAIN - FUNCTIONAL ASSESSMENT: PAIN_FUNCTIONAL_ASSESSMENT: 0-10

## 2024-03-23 ASSESSMENT — PAIN SCALES - GENERAL: PAINLEVEL_OUTOF10: 5

## 2024-03-23 ASSESSMENT — LIFESTYLE VARIABLES: HOW OFTEN DO YOU HAVE A DRINK CONTAINING ALCOHOL: NEVER

## 2024-03-23 NOTE — DISCHARGE INSTRUCTIONS
Your strep swab is positive.  Your COVID test is negative.  Take medication as prescribed.  Begin using a new toothbrush after being on the antibiotic for 3 days.  Take Tylenol or ibuprofen if needed.  Return to the emergency department for any new, worsening, or concerning symptoms.

## 2024-03-23 NOTE — ED PROVIDER NOTES
Emergency Department Provider Note       PCP: Lynn Irvin PA   Age: 28 y.o.   Sex: female     DISPOSITION Decision To Discharge 03/23/2024 12:18:42 PM       ICD-10-CM    1. Strep pharyngitis  J02.0           Medical Decision Making     Well-appearing 28-year-old female presents emergency department today with some sore throat and bodyaches.  She appears in no acute distress.  Lung sounds are clear.  She is afebrile.  No tachycardia.  Through shared decision-making, we will check for COVID and strep.    Positive for strep.  Will treat with amoxicillin.  ER return precautions discussed.     1 acute, uncomplicated illness or injury.  Prescription drug management performed.  Shared medical decision making was utilized in creating the patients health plan today.    I independently ordered and reviewed each unique test.                     History     28-year-old female presents to the emergency department today with complaint of sore throat and bodyaches.  She states that she did have a fever and chills last night.  She denies any chest pain, abdominal pain, or shortness of breath.  She states she was recently treated for a UTI but feels that the symptoms have all resolved.  She is concerned about possible strep throat.  She denies other complaints or concerns at this time.  She denies any treatment for her symptoms.    The history is provided by the patient.     Physical Exam     Vitals signs and nursing note reviewed:  Vitals:    03/23/24 1130 03/23/24 1132   BP: 139/84    Pulse: 91    Resp: 17    Temp: 98.6 °F (37 °C)    TempSrc: Oral    SpO2: 99%    Weight:  130.2 kg (287 lb)   Height:  1.702 m (5' 7\")      Physical Exam  Vitals and nursing note reviewed.   Constitutional:       General: She is not in acute distress.     Appearance: Normal appearance. She is well-developed. She is not ill-appearing, toxic-appearing or diaphoretic.   HENT:      Head: Normocephalic and atraumatic.      Mouth/Throat:

## 2024-03-23 NOTE — ED TRIAGE NOTES
Pt to ED ambulatory to triage with mask in place with c/o generalized body aches, sore throat, fever, chills, generalized GI discomfort, left flank pain, that started yesterday. Pt states being dx with UTI at the beginning of the week and completed her course of antibiotics but states increased flank pain. Denies urinary s/sx. Pt states all the other s/sx started after completion of her antibiotics. Denies sick contacts at this time.

## 2024-03-23 NOTE — ED NOTES
I have reviewed discharge instructions with the patient.  The patient verbalized understanding.    Patient left ED via Discharge Method: ambulatory to Home with self.    Opportunity for questions and clarification provided.       Patient given 1 scripts.         To continue your aftercare when you leave the hospital, you may receive an automated call from our care team to check in on how you are doing.  This is a free service and part of our promise to provide the best care and service to meet your aftercare needs.” If you have questions, or wish to unsubscribe from this service please call [phone].  Thank you for Choosing our [facility] Emergency Department.        Mily Mayer LPN  03/23/24 2062

## 2024-12-07 ENCOUNTER — HOSPITAL ENCOUNTER (EMERGENCY)
Age: 29
Discharge: HOME | End: 2024-12-07
Payer: MEDICAID

## 2024-12-07 VITALS
BODY MASS INDEX: 44.42 KG/M2 | HEIGHT: 67 IN | RESPIRATION RATE: 18 BRPM | OXYGEN SATURATION: 100 % | TEMPERATURE: 98.1 F | HEART RATE: 79 BPM | SYSTOLIC BLOOD PRESSURE: 137 MMHG | DIASTOLIC BLOOD PRESSURE: 82 MMHG | WEIGHT: 283 LBS

## 2024-12-07 DIAGNOSIS — J02.9 VIRAL PHARYNGITIS: Primary | ICD-10-CM

## 2024-12-07 LAB
FLUAV RNA SPEC QL NAA+PROBE: NOT DETECTED
FLUBV RNA SPEC QL NAA+PROBE: NOT DETECTED
SARS-COV-2 RDRP RESP QL NAA+PROBE: NOT DETECTED
SOURCE: NORMAL
STREP, MOLECULAR: NOT DETECTED

## 2024-12-07 PROCEDURE — 99283 EMERGENCY DEPT VISIT LOW MDM: CPT

## 2024-12-07 PROCEDURE — 87635 SARS-COV-2 COVID-19 AMP PRB: CPT

## 2024-12-07 PROCEDURE — 87502 INFLUENZA DNA AMP PROBE: CPT

## 2024-12-07 PROCEDURE — 87651 STREP A DNA AMP PROBE: CPT

## 2024-12-07 RX ORDER — PREDNISONE 20 MG/1
60 TABLET ORAL DAILY
Qty: 15 TABLET | Refills: 0 | Status: SHIPPED | OUTPATIENT
Start: 2024-12-07 | End: 2024-12-12

## 2024-12-07 ASSESSMENT — LIFESTYLE VARIABLES
HOW MANY STANDARD DRINKS CONTAINING ALCOHOL DO YOU HAVE ON A TYPICAL DAY: PATIENT DOES NOT DRINK
HOW OFTEN DO YOU HAVE A DRINK CONTAINING ALCOHOL: NEVER

## 2024-12-07 ASSESSMENT — PAIN - FUNCTIONAL ASSESSMENT
PAIN_FUNCTIONAL_ASSESSMENT: NONE - DENIES PAIN
PAIN_FUNCTIONAL_ASSESSMENT: NONE - DENIES PAIN

## 2024-12-07 NOTE — DISCHARGE INSTRUCTIONS
Your strep swab resulted negative.  This is viral pharyngitis.  No specific treatment will shorten the duration of symptoms however some people say steroids will give them a little bit of relief.  I have written a prescription for this.

## 2024-12-07 NOTE — ED NOTES
Patient mobility status  with no difficulty.     I have reviewed discharge instructions with the patient.  The patient verbalized understanding.    Patient left ED via Discharge Method: ambulatory to Home with Child.    Opportunity for questions and clarification provided.     Patient given 1 scripts.           Heaven Lauren RN  12/07/24 0224

## 2024-12-07 NOTE — ED PROVIDER NOTES
Emergency Department Provider Note       PCP: Lynn Irvin PA   Age: 29 y.o.   Sex: female     DISPOSITION Decision To Discharge 12/07/2024 02:14:50 AM   DISPOSITION CONDITION Stable            ICD-10-CM    1. Viral pharyngitis  J02.9         Medical Decision Making     Well-appearing 29-year-old female.  Comes in with daughter for viral-like illness.  Symptoms of a few days.  Not worsening or alleviating.  Presents here for testing.  Workup is unremarkable.  Will discharge home with symptomatic treatment     1 acute complicated illness or injury.  Over the counter drug management performed.  Shared medical decision making was utilized in creating the patients health plan today.    I independently ordered and reviewed each unique test.     History     This is a 29-year-old female who presents here with complaints of sore throat.  She brings in her daughter who is also being seen for similar symptoms.  Symptoms are associate with nasal congestion, cough and rhinorrhea.  She has not taken any medications at home for her symptoms.  No fevers or chills.    The history is provided by the patient. No  was used.     Physical Exam     Vitals signs and nursing note reviewed:  Vitals:    12/07/24 0120   BP: 139/86   Pulse: 81   Resp: 19   Temp: 98.2 °F (36.8 °C)   TempSrc: Oral   SpO2: 99%   Weight: 128.4 kg (283 lb)   Height: 1.702 m (5' 7\")      Physical Exam  Vitals and nursing note reviewed.   Constitutional:       General: She is not in acute distress.     Appearance: Normal appearance. She is not toxic-appearing.   HENT:      Head: Normocephalic and atraumatic.      Nose: Congestion and rhinorrhea present.      Mouth/Throat:      Pharynx: Posterior oropharyngeal erythema present.   Cardiovascular:      Rate and Rhythm: Normal rate.   Pulmonary:      Effort: Pulmonary effort is normal. No respiratory distress.   Musculoskeletal:         General: Normal range of motion.   Neurological:       General: No focal deficit present.      Mental Status: She is alert and oriented to person, place, and time.   Psychiatric:         Behavior: Behavior normal.        Procedures     Procedures    Orders Placed This Encounter   Procedures    COVID-19, Rapid    Influenza A/B, Molecular    Group A Strep Screen By PCR        Medications given during this emergency department visit:  Medications - No data to display    New Prescriptions    PREDNISONE (DELTASONE) 20 MG TABLET    Take 3 tablets by mouth daily for 5 days        No past medical history on file.     Past Surgical History:   Procedure Laterality Date    GYN      c section    ORTHOPEDIC SURGERY      left ankle        Social History     Socioeconomic History    Marital status: Single   Tobacco Use    Smoking status: Never    Smokeless tobacco: Never   Substance and Sexual Activity    Alcohol use: No    Drug use: No     Social Determinants of Health     Social Connections: Unknown (10/9/2024)    Received from [employer]    Social Connections     Frequency of Communication with Friends and Family: Not asked     Frequency of Social Gatherings with Friends and Family: Not asked   Intimate Partner Violence: Unknown (10/9/2024)    Received from [employer]    Intimate Partner Violence     Fear of Current or Ex-Partner: Not asked     Emotionally Abused: Not asked     Physically Abused: Not asked     Sexually Abused: Not asked   Housing Stability: Not At Risk (3/9/2022)    Received from [employer], [employer]    Housing Stability     Was there a time when you did not have a steady place to sleep: Not asked     Worried that the place you are staying is making you sick: Not asked        Previous Medications    DICLOFENAC (VOLTAREN) 50 MG EC TABLET    Take 50 mg by mouth 2 times daily    DICLOFENAC (VOLTAREN) 75 MG EC TABLET    Take 75 mg by mouth 2 times daily    HYDROCODONE-ACETAMINOPHEN (NORCO) 5-325 MG PER TABLET    Take 1 tablet by mouth every 8 hours as